# Patient Record
Sex: MALE | Race: WHITE
[De-identification: names, ages, dates, MRNs, and addresses within clinical notes are randomized per-mention and may not be internally consistent; named-entity substitution may affect disease eponyms.]

---

## 2020-12-13 ENCOUNTER — HOSPITAL ENCOUNTER (EMERGENCY)
Dept: HOSPITAL 8 - ED | Age: 60
Discharge: HOME | End: 2020-12-13
Payer: MEDICAID

## 2020-12-13 VITALS — WEIGHT: 158.73 LBS | BODY MASS INDEX: 24.06 KG/M2 | HEIGHT: 68 IN

## 2020-12-13 VITALS — DIASTOLIC BLOOD PRESSURE: 77 MMHG | SYSTOLIC BLOOD PRESSURE: 122 MMHG

## 2020-12-13 DIAGNOSIS — J00: Primary | ICD-10-CM

## 2020-12-13 DIAGNOSIS — Z20.828: ICD-10-CM

## 2020-12-13 PROCEDURE — 99283 EMERGENCY DEPT VISIT LOW MDM: CPT

## 2020-12-13 PROCEDURE — 87635 SARS-COV-2 COVID-19 AMP PRB: CPT
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
## 2021-02-11 ENCOUNTER — HOSPITAL ENCOUNTER (EMERGENCY)
Facility: MEDICAL CENTER | Age: 61
End: 2021-02-11
Attending: EMERGENCY MEDICINE | Admitting: EMERGENCY MEDICINE
Payer: MEDICAID

## 2021-02-11 VITALS
OXYGEN SATURATION: 96 % | TEMPERATURE: 97.4 F | BODY MASS INDEX: 18.51 KG/M2 | SYSTOLIC BLOOD PRESSURE: 134 MMHG | DIASTOLIC BLOOD PRESSURE: 72 MMHG | WEIGHT: 125 LBS | HEART RATE: 75 BPM | HEIGHT: 69 IN | RESPIRATION RATE: 16 BRPM

## 2021-02-11 DIAGNOSIS — K08.89 PAIN, DENTAL: ICD-10-CM

## 2021-02-11 DIAGNOSIS — Z76.0 MEDICATION REFILL: ICD-10-CM

## 2021-02-11 PROCEDURE — 700102 HCHG RX REV CODE 250 W/ 637 OVERRIDE(OP): Performed by: EMERGENCY MEDICINE

## 2021-02-11 PROCEDURE — 99283 EMERGENCY DEPT VISIT LOW MDM: CPT

## 2021-02-11 PROCEDURE — A9270 NON-COVERED ITEM OR SERVICE: HCPCS | Performed by: EMERGENCY MEDICINE

## 2021-02-11 RX ORDER — HYDROCODONE BITARTRATE AND ACETAMINOPHEN 5; 325 MG/1; MG/1
1 TABLET ORAL ONCE
Status: COMPLETED | OUTPATIENT
Start: 2021-02-11 | End: 2021-02-11

## 2021-02-11 RX ORDER — PENICILLIN V POTASSIUM 500 MG/1
500 TABLET ORAL ONCE
Status: COMPLETED | OUTPATIENT
Start: 2021-02-11 | End: 2021-02-11

## 2021-02-11 RX ORDER — ALBUTEROL SULFATE 90 UG/1
2 AEROSOL, METERED RESPIRATORY (INHALATION) EVERY 6 HOURS PRN
Qty: 8.5 G | Refills: 0 | Status: SHIPPED | OUTPATIENT
Start: 2021-02-11

## 2021-02-11 RX ORDER — ALBUTEROL SULFATE 0.63 MG/3ML
0.63 SOLUTION RESPIRATORY (INHALATION) EVERY 4 HOURS PRN
COMMUNITY
End: 2021-02-11

## 2021-02-11 RX ORDER — PENICILLIN V POTASSIUM 500 MG/1
500 TABLET ORAL 4 TIMES DAILY
Qty: 28 TABLET | Refills: 0 | Status: SHIPPED | OUTPATIENT
Start: 2021-02-11 | End: 2021-02-18

## 2021-02-11 RX ADMIN — HYDROCODONE BITARTRATE AND ACETAMINOPHEN 1 TABLET: 5; 325 TABLET ORAL at 17:33

## 2021-02-11 RX ADMIN — PENICILLIN V POTASSIUM 500 MG: 500 TABLET, FILM COATED ORAL at 17:34

## 2021-02-12 NOTE — ED PROVIDER NOTES
"ED Provider Note  CHIEF COMPLAINT  Chief Complaint   Patient presents with    Dental Pain     c/o tooth pain. bottom front teeth. \"Pt states he wants to take a hammer to his teeth\"    Medication Refill     refill for his inhaler. pt diagnosed for COPD       HPI  Dale Mckeon is a 60 y.o. male who presents to the ER complaining of lower toothache for the last several days.  He says he is actually had a tooth ache for about a month, but it seems to gotten worse over the last few days.  He has not been able to see a dentist.  He also would like a refill of his inhaler.  No facial swelling.  No fevers or chills.  No nausea or vomiting.  No swelling under the tongue or under the chin.  No difficulty breathing or swallowing.    REVIEW OF SYSTEMS  Positive for tooth ache. Negative for fevers, chills, difficulty swallowing, difficulty breathing, facial swelling, nausea, vomiting  PAST MEDICAL HISTORY   has a past medical history of Chronic obstructive pulmonary disease (HCC).    SOCIAL HISTORY  Social History     Tobacco Use    Smoking status: Current Every Day Smoker   Substance and Sexual Activity    Alcohol use: Yes    Drug use: Not Currently    Sexual activity: Not on file       SURGICAL HISTORY  patient denies any surgical history    CURRENT MEDICATIONS  Reviewed.  See Encounter Summary.      ALLERGIES  No Known Allergies    PHYSICAL EXAM  VITAL SIGNS: /78   Pulse 71   Temp 36.2 °C (97.2 °F) (Temporal)   Resp 16   Ht 1.753 m (5' 9\")   Wt 56.7 kg (125 lb)   SpO2 95%   BMI 18.46 kg/m²   Constitutional: Alert in moderate to severe apparent distress due to dental pain.  HENT: Normocephalic, atraumatic; Bilateral external ears normal. Nose normal.  No facial swelling.  No sublingual or submental swelling.  He has poor dentition throughout.  The left lower front tooth is decayed and tender to percussion.  There is some mild associated gingival swelling.  No facial swelling.  No trismus.  Eyes: Pupils are " equal and reactive. Conjunctiva normal, non-icteric.   Thorax & Lungs: Decreased breath sounds bilaterally.  No wheezes rales or rhonchi.  Heart regular rate and rhythm without murmurs rubs or gallops.  Skin: Warm and Dry, No erythema, No rash.   Extremities:   Full range of motion  Neurologic: Alert and oriented x 4, clear speech   Psychiatric: Affect and Mood normal      COURSE & MEDICAL DECISION MAKING  Nursing notes and vital signs were reviewed. (See chart for details)    I verified that the patient was wearing a mask and I was wearing appropriate PPE every time I entered the room. The patient's mask was on the patient at all times during my encounter except for a brief view of the oropharynx.     The patient presents to the Emergency Department with complaints of dental pain for the last few days.  He says it actually started about a month ago but it got worse over the last few days.  He is tender in the left front lower tooth.  There is some associated gingival swelling.  No sublingual or submental swelling.  No concern for Shyam's angina at this time.  Patient has not taken any Tylenol or ibuprofen today.  I gave him a [place] here in the ER as he was in quite a bit of discomfort due to his tooth ache.  Otherwise I told him he needs to be on antibiotic and he is to take over-the-counter Tylenol and ibuprofen until he sees the dentist or oral surgeon.  He understands importance of follow-up.  He was given his first dose of penicillin here in the ER.  The patient also requests a refill of an albuterol inhaler for his COPD.  He is not any respiratory distress and has no respiratory complaints at this time.  He simply just needs a refill.  O2 sats are 95%.  He says he has been trying to get in at the [facility] and the [facility] for the last couple weeks but has been unable to make an appointment.  At this time patient has a lower left front tooth which is very tender to percussion.   This is the tooth identified by the patient is because of his pain.  At this time no evidence for sepsis or facial cellulitis.  He has been given strict return precautions and discharge instructions and he understands treatment plan and follow-up.      Discharge Medications: Pen-Vee K 500 mg 1 p.o. 4 times daily x10 days, albuterol inhaler    FINAL IMPRESSION  1. Medication refill Acute    2. Pain, dental Acute        This dictation has been created using voice recognition software. The accuracy of the dictation is limited by the abilities of the software. I expect there may be some errors of grammar and possibly content. I made every attempt to manually correct the errors within my dictation. However, errors related to voice recognition software may still exist and should be interpreted within the appropriate context.

## 2021-02-12 NOTE — DISCHARGE INSTRUCTIONS
Return to ER for worsening pain, facial swelling, changing pain, fever over 100.4, chills, swelling under your chin or under your tongue, nausea, vomiting, or for any concerns.    Perform good dental hygiene by brushing your teeth 2-3 times a day and flossing every day.     Use over the counter motrin and tylenol for pain.    Follow up with Dr. Becker, oral surgeon, in 1-2 days. Call for appointment.

## 2021-02-12 NOTE — ED TRIAGE NOTES
"..  Chief Complaint   Patient presents with   • Dental Pain     c/o tooth pain. bottom front teeth. \"Pt states he wants to take a hammer to his teeth\"   • Medication Refill     refill for his inhaler. pt diagnosed for COPD         /78   Pulse 71   Temp 36.2 °C (97.2 °F) (Temporal)   Resp 16   Ht 1.753 m (5' 9\")   Wt 56.7 kg (125 lb)   SpO2 95%          Explained triage process, to waiting room. Asked to inform RN if questions or concerns arise.   "

## 2022-01-09 ENCOUNTER — APPOINTMENT (OUTPATIENT)
Dept: RADIOLOGY | Facility: MEDICAL CENTER | Age: 62
DRG: 193 | End: 2022-01-09
Attending: EMERGENCY MEDICINE
Payer: MEDICAID

## 2022-01-09 ENCOUNTER — APPOINTMENT (OUTPATIENT)
Dept: RADIOLOGY | Facility: MEDICAL CENTER | Age: 62
DRG: 193 | End: 2022-01-09
Payer: MEDICAID

## 2022-01-09 ENCOUNTER — HOSPITAL ENCOUNTER (INPATIENT)
Facility: MEDICAL CENTER | Age: 62
LOS: 1 days | DRG: 193 | End: 2022-01-10
Attending: EMERGENCY MEDICINE | Admitting: STUDENT IN AN ORGANIZED HEALTH CARE EDUCATION/TRAINING PROGRAM
Payer: MEDICAID

## 2022-01-09 ENCOUNTER — HOSPITAL ENCOUNTER (EMERGENCY)
Facility: MEDICAL CENTER | Age: 62
DRG: 193 | End: 2022-01-09
Payer: MEDICAID

## 2022-01-09 VITALS
HEIGHT: 69 IN | RESPIRATION RATE: 16 BRPM | DIASTOLIC BLOOD PRESSURE: 56 MMHG | WEIGHT: 151.24 LBS | BODY MASS INDEX: 22.4 KG/M2 | OXYGEN SATURATION: 91 % | TEMPERATURE: 97.6 F | SYSTOLIC BLOOD PRESSURE: 95 MMHG | HEART RATE: 60 BPM

## 2022-01-09 DIAGNOSIS — J18.9 PNEUMONIA OF RIGHT LUNG DUE TO INFECTIOUS ORGANISM, UNSPECIFIED PART OF LUNG: ICD-10-CM

## 2022-01-09 DIAGNOSIS — J90 PLEURAL EFFUSION: ICD-10-CM

## 2022-01-09 PROBLEM — Z72.0 TOBACCO USE: Status: ACTIVE | Noted: 2022-01-09

## 2022-01-09 PROBLEM — J44.0 CHRONIC OBSTRUCTIVE PULMONARY DISEASE WITH ACUTE LOWER RESPIRATORY INFECTION (HCC): Status: ACTIVE | Noted: 2022-01-09

## 2022-01-09 PROBLEM — R07.81 PLEURITIC CHEST PAIN: Status: ACTIVE | Noted: 2022-01-09

## 2022-01-09 LAB
ALBUMIN SERPL BCP-MCNC: 3.5 G/DL (ref 3.2–4.9)
ALBUMIN SERPL BCP-MCNC: 3.8 G/DL (ref 3.2–4.9)
ALBUMIN/GLOB SERPL: 1.1 G/DL
ALBUMIN/GLOB SERPL: 1.2 G/DL
ALP SERPL-CCNC: 90 U/L (ref 30–99)
ALP SERPL-CCNC: 96 U/L (ref 30–99)
ALT SERPL-CCNC: 20 U/L (ref 2–50)
ALT SERPL-CCNC: 21 U/L (ref 2–50)
ANION GAP SERPL CALC-SCNC: 13 MMOL/L (ref 7–16)
ANION GAP SERPL CALC-SCNC: 9 MMOL/L (ref 7–16)
AST SERPL-CCNC: 17 U/L (ref 12–45)
AST SERPL-CCNC: <5 U/L (ref 12–45)
BASOPHILS # BLD AUTO: 1.7 % (ref 0–1.8)
BASOPHILS # BLD AUTO: 2.6 % (ref 0–1.8)
BASOPHILS # BLD: 0.31 K/UL (ref 0–0.12)
BASOPHILS # BLD: 0.46 K/UL (ref 0–0.12)
BILIRUB SERPL-MCNC: 0.3 MG/DL (ref 0.1–1.5)
BILIRUB SERPL-MCNC: 0.4 MG/DL (ref 0.1–1.5)
BUN SERPL-MCNC: 13 MG/DL (ref 8–22)
BUN SERPL-MCNC: 14 MG/DL (ref 8–22)
CALCIUM SERPL-MCNC: 10.2 MG/DL (ref 8.5–10.5)
CALCIUM SERPL-MCNC: 9.5 MG/DL (ref 8.5–10.5)
CHLORIDE SERPL-SCNC: 101 MMOL/L (ref 96–112)
CHLORIDE SERPL-SCNC: 101 MMOL/L (ref 96–112)
CO2 SERPL-SCNC: 24 MMOL/L (ref 20–33)
CO2 SERPL-SCNC: 27 MMOL/L (ref 20–33)
CREAT SERPL-MCNC: 0.84 MG/DL (ref 0.5–1.4)
CREAT SERPL-MCNC: 0.87 MG/DL (ref 0.5–1.4)
EKG IMPRESSION: NORMAL
EOSINOPHIL # BLD AUTO: 0.96 K/UL (ref 0–0.51)
EOSINOPHIL # BLD AUTO: 1.36 K/UL (ref 0–0.51)
EOSINOPHIL NFR BLD: 5.2 % (ref 0–6.9)
EOSINOPHIL NFR BLD: 7.7 % (ref 0–6.9)
ERYTHROCYTE [DISTWIDTH] IN BLOOD BY AUTOMATED COUNT: 46.5 FL (ref 35.9–50)
ERYTHROCYTE [DISTWIDTH] IN BLOOD BY AUTOMATED COUNT: 46.5 FL (ref 35.9–50)
FLUAV RNA SPEC QL NAA+PROBE: NEGATIVE
FLUBV RNA SPEC QL NAA+PROBE: NEGATIVE
GLOBULIN SER CALC-MCNC: 3.2 G/DL (ref 1.9–3.5)
GLOBULIN SER CALC-MCNC: 3.3 G/DL (ref 1.9–3.5)
GLUCOSE SERPL-MCNC: 94 MG/DL (ref 65–99)
GLUCOSE SERPL-MCNC: 95 MG/DL (ref 65–99)
HCT VFR BLD AUTO: 46.4 % (ref 42–52)
HCT VFR BLD AUTO: 47.5 % (ref 42–52)
HGB BLD-MCNC: 15.1 G/DL (ref 14–18)
HGB BLD-MCNC: 15.7 G/DL (ref 14–18)
LYMPHOCYTES # BLD AUTO: 2.73 K/UL (ref 1–4.8)
LYMPHOCYTES # BLD AUTO: 2.74 K/UL (ref 1–4.8)
LYMPHOCYTES NFR BLD: 14.8 % (ref 22–41)
LYMPHOCYTES NFR BLD: 15.5 % (ref 22–41)
MANUAL DIFF BLD: NORMAL
MANUAL DIFF BLD: NORMAL
MCH RBC QN AUTO: 30.3 PG (ref 27–33)
MCH RBC QN AUTO: 30.7 PG (ref 27–33)
MCHC RBC AUTO-ENTMCNC: 32.5 G/DL (ref 33.7–35.3)
MCHC RBC AUTO-ENTMCNC: 33.1 G/DL (ref 33.7–35.3)
MCV RBC AUTO: 92.8 FL (ref 81.4–97.8)
MCV RBC AUTO: 93.2 FL (ref 81.4–97.8)
METAMYELOCYTES NFR BLD MANUAL: 0.9 %
MONOCYTES # BLD AUTO: 1.37 K/UL (ref 0–0.85)
MONOCYTES # BLD AUTO: 1.44 K/UL (ref 0–0.85)
MONOCYTES NFR BLD AUTO: 7.8 % (ref 0–13.4)
MONOCYTES NFR BLD AUTO: 7.8 % (ref 0–13.4)
MORPHOLOGY BLD-IMP: NORMAL
MORPHOLOGY BLD-IMP: NORMAL
MYELOCYTES NFR BLD MANUAL: 0.9 %
MYELOCYTES NFR BLD MANUAL: 1.7 %
NEUTROPHILS # BLD AUTO: 11.53 K/UL (ref 1.82–7.42)
NEUTROPHILS # BLD AUTO: 12.56 K/UL (ref 1.82–7.42)
NEUTROPHILS NFR BLD: 65.5 % (ref 44–72)
NEUTROPHILS NFR BLD: 67 % (ref 44–72)
NEUTS BAND NFR BLD MANUAL: 0.9 % (ref 0–10)
NRBC # BLD AUTO: 0 K/UL
NRBC # BLD AUTO: 0 K/UL
NRBC BLD-RTO: 0 /100 WBC
NRBC BLD-RTO: 0 /100 WBC
PLATELET # BLD AUTO: 706 K/UL (ref 164–446)
PLATELET # BLD AUTO: 741 K/UL (ref 164–446)
PLATELET BLD QL SMEAR: NORMAL
PLATELET BLD QL SMEAR: NORMAL
PMV BLD AUTO: 8.4 FL (ref 9–12.9)
PMV BLD AUTO: 8.5 FL (ref 9–12.9)
POTASSIUM SERPL-SCNC: 4.6 MMOL/L (ref 3.6–5.5)
POTASSIUM SERPL-SCNC: 4.7 MMOL/L (ref 3.6–5.5)
PROT SERPL-MCNC: 6.7 G/DL (ref 6–8.2)
PROT SERPL-MCNC: 7.1 G/DL (ref 6–8.2)
RBC # BLD AUTO: 4.98 M/UL (ref 4.7–6.1)
RBC # BLD AUTO: 5.12 M/UL (ref 4.7–6.1)
RBC BLD AUTO: NORMAL
RSV RNA SPEC QL NAA+PROBE: NEGATIVE
SARS-COV-2 RNA RESP QL NAA+PROBE: NOTDETECTED
SODIUM SERPL-SCNC: 137 MMOL/L (ref 135–145)
SODIUM SERPL-SCNC: 138 MMOL/L (ref 135–145)
SPECIMEN SOURCE: NORMAL
TROPONIN T SERPL-MCNC: 13 NG/L (ref 6–19)
WBC # BLD AUTO: 17.6 K/UL (ref 4.8–10.8)
WBC # BLD AUTO: 18.5 K/UL (ref 4.8–10.8)

## 2022-01-09 PROCEDURE — A9270 NON-COVERED ITEM OR SERVICE: HCPCS | Performed by: STUDENT IN AN ORGANIZED HEALTH CARE EDUCATION/TRAINING PROGRAM

## 2022-01-09 PROCEDURE — 99223 1ST HOSP IP/OBS HIGH 75: CPT | Mod: 25 | Performed by: STUDENT IN AN ORGANIZED HEALTH CARE EDUCATION/TRAINING PROGRAM

## 2022-01-09 PROCEDURE — 700111 HCHG RX REV CODE 636 W/ 250 OVERRIDE (IP): Performed by: EMERGENCY MEDICINE

## 2022-01-09 PROCEDURE — 85027 COMPLETE CBC AUTOMATED: CPT

## 2022-01-09 PROCEDURE — 700105 HCHG RX REV CODE 258: Performed by: STUDENT IN AN ORGANIZED HEALTH CARE EDUCATION/TRAINING PROGRAM

## 2022-01-09 PROCEDURE — 85007 BL SMEAR W/DIFF WBC COUNT: CPT

## 2022-01-09 PROCEDURE — 99285 EMERGENCY DEPT VISIT HI MDM: CPT

## 2022-01-09 PROCEDURE — 93005 ELECTROCARDIOGRAM TRACING: CPT | Performed by: EMERGENCY MEDICINE

## 2022-01-09 PROCEDURE — 85007 BL SMEAR W/DIFF WBC COUNT: CPT | Mod: 91

## 2022-01-09 PROCEDURE — 80053 COMPREHEN METABOLIC PANEL: CPT | Mod: 91

## 2022-01-09 PROCEDURE — 99406 BEHAV CHNG SMOKING 3-10 MIN: CPT

## 2022-01-09 PROCEDURE — 93005 ELECTROCARDIOGRAM TRACING: CPT

## 2022-01-09 PROCEDURE — 700117 HCHG RX CONTRAST REV CODE 255: Performed by: EMERGENCY MEDICINE

## 2022-01-09 PROCEDURE — 94640 AIRWAY INHALATION TREATMENT: CPT

## 2022-01-09 PROCEDURE — 96375 TX/PRO/DX INJ NEW DRUG ADDON: CPT

## 2022-01-09 PROCEDURE — 770001 HCHG ROOM/CARE - MED/SURG/GYN PRIV*

## 2022-01-09 PROCEDURE — 80053 COMPREHEN METABOLIC PANEL: CPT

## 2022-01-09 PROCEDURE — 0241U HCHG SARS-COV-2 COVID-19 NFCT DS RESP RNA 4 TRGT MIC: CPT

## 2022-01-09 PROCEDURE — 85027 COMPLETE CBC AUTOMATED: CPT | Mod: 91

## 2022-01-09 PROCEDURE — 84157 ASSAY OF PROTEIN OTHER: CPT

## 2022-01-09 PROCEDURE — 700111 HCHG RX REV CODE 636 W/ 250 OVERRIDE (IP): Performed by: STUDENT IN AN ORGANIZED HEALTH CARE EDUCATION/TRAINING PROGRAM

## 2022-01-09 PROCEDURE — 99406 BEHAV CHNG SMOKING 3-10 MIN: CPT | Performed by: STUDENT IN AN ORGANIZED HEALTH CARE EDUCATION/TRAINING PROGRAM

## 2022-01-09 PROCEDURE — A9270 NON-COVERED ITEM OR SERVICE: HCPCS | Performed by: EMERGENCY MEDICINE

## 2022-01-09 PROCEDURE — 84484 ASSAY OF TROPONIN QUANT: CPT

## 2022-01-09 PROCEDURE — 700102 HCHG RX REV CODE 250 W/ 637 OVERRIDE(OP): Performed by: EMERGENCY MEDICINE

## 2022-01-09 PROCEDURE — 700102 HCHG RX REV CODE 250 W/ 637 OVERRIDE(OP): Performed by: STUDENT IN AN ORGANIZED HEALTH CARE EDUCATION/TRAINING PROGRAM

## 2022-01-09 PROCEDURE — 302449 STATCHG TRIAGE ONLY (STATISTIC)

## 2022-01-09 PROCEDURE — 71275 CT ANGIOGRAPHY CHEST: CPT

## 2022-01-09 PROCEDURE — 96365 THER/PROPH/DIAG IV INF INIT: CPT

## 2022-01-09 RX ORDER — ALBUTEROL SULFATE 90 UG/1
2 AEROSOL, METERED RESPIRATORY (INHALATION) EVERY 6 HOURS PRN
Status: DISCONTINUED | OUTPATIENT
Start: 2022-01-09 | End: 2022-01-10 | Stop reason: HOSPADM

## 2022-01-09 RX ORDER — HYDROXYZINE HYDROCHLORIDE 25 MG/1
25 TABLET, FILM COATED ORAL 3 TIMES DAILY PRN
Status: DISCONTINUED | OUTPATIENT
Start: 2022-01-09 | End: 2022-01-10 | Stop reason: HOSPADM

## 2022-01-09 RX ORDER — AMOXICILLIN 250 MG
2 CAPSULE ORAL 2 TIMES DAILY
Status: DISCONTINUED | OUTPATIENT
Start: 2022-01-09 | End: 2022-01-10 | Stop reason: HOSPADM

## 2022-01-09 RX ORDER — TIOTROPIUM BROMIDE AND OLODATEROL 3.124; 2.736 UG/1; UG/1
2 SPRAY, METERED RESPIRATORY (INHALATION) DAILY
COMMUNITY

## 2022-01-09 RX ORDER — ALBUTEROL SULFATE 90 UG/1
2 AEROSOL, METERED RESPIRATORY (INHALATION) ONCE
Status: COMPLETED | OUTPATIENT
Start: 2022-01-09 | End: 2022-01-09

## 2022-01-09 RX ORDER — CEFTRIAXONE 2 G/1
2 INJECTION, POWDER, FOR SOLUTION INTRAMUSCULAR; INTRAVENOUS ONCE
Status: COMPLETED | OUTPATIENT
Start: 2022-01-09 | End: 2022-01-09

## 2022-01-09 RX ORDER — IBUPROFEN 200 MG
1000 TABLET ORAL
COMMUNITY

## 2022-01-09 RX ORDER — BISACODYL 10 MG
10 SUPPOSITORY, RECTAL RECTAL
Status: DISCONTINUED | OUTPATIENT
Start: 2022-01-09 | End: 2022-01-10 | Stop reason: HOSPADM

## 2022-01-09 RX ORDER — NAPROXEN SODIUM 220 MG
220 TABLET ORAL EVERY 8 HOURS PRN
COMMUNITY

## 2022-01-09 RX ORDER — AZITHROMYCIN 500 MG/5ML
500 INJECTION, POWDER, LYOPHILIZED, FOR SOLUTION INTRAVENOUS EVERY 24 HOURS
Status: DISCONTINUED | OUTPATIENT
Start: 2022-01-10 | End: 2022-01-10

## 2022-01-09 RX ORDER — POLYETHYLENE GLYCOL 3350 17 G/17G
1 POWDER, FOR SOLUTION ORAL
Status: DISCONTINUED | OUTPATIENT
Start: 2022-01-09 | End: 2022-01-10 | Stop reason: HOSPADM

## 2022-01-09 RX ORDER — AZITHROMYCIN 250 MG/1
500 TABLET, FILM COATED ORAL DAILY
Status: DISCONTINUED | OUTPATIENT
Start: 2022-01-10 | End: 2022-01-10

## 2022-01-09 RX ORDER — IBUPROFEN 600 MG/1
600 TABLET ORAL EVERY 6 HOURS PRN
Status: DISCONTINUED | OUTPATIENT
Start: 2022-01-09 | End: 2022-01-10 | Stop reason: HOSPADM

## 2022-01-09 RX ORDER — ACETAMINOPHEN 325 MG/1
650 TABLET ORAL EVERY 6 HOURS PRN
Status: DISCONTINUED | OUTPATIENT
Start: 2022-01-09 | End: 2022-01-10 | Stop reason: HOSPADM

## 2022-01-09 RX ORDER — LABETALOL HYDROCHLORIDE 5 MG/ML
10 INJECTION, SOLUTION INTRAVENOUS EVERY 4 HOURS PRN
Status: DISCONTINUED | OUTPATIENT
Start: 2022-01-09 | End: 2022-01-10 | Stop reason: HOSPADM

## 2022-01-09 RX ADMIN — IBUPROFEN 600 MG: 600 TABLET ORAL at 23:08

## 2022-01-09 RX ADMIN — IOHEXOL 50 ML: 350 INJECTION, SOLUTION INTRAVENOUS at 19:30

## 2022-01-09 RX ADMIN — CEFTRIAXONE SODIUM 2 G: 2 INJECTION, POWDER, FOR SOLUTION INTRAMUSCULAR; INTRAVENOUS at 20:14

## 2022-01-09 RX ADMIN — ALBUTEROL SULFATE 2 PUFF: 90 AEROSOL, METERED RESPIRATORY (INHALATION) at 18:33

## 2022-01-09 RX ADMIN — SENNOSIDES AND DOCUSATE SODIUM 2 TABLET: 50; 8.6 TABLET ORAL at 20:34

## 2022-01-09 RX ADMIN — SODIUM CHLORIDE 3 G: 900 INJECTION INTRAVENOUS at 20:35

## 2022-01-09 RX ADMIN — NICOTINE POLACRILEX 2 MG: 2 GUM, CHEWING BUCCAL at 23:16

## 2022-01-09 RX ADMIN — HYDROXYZINE HYDROCHLORIDE 25 MG: 25 TABLET, FILM COATED ORAL at 23:46

## 2022-01-09 ASSESSMENT — ENCOUNTER SYMPTOMS
SPUTUM PRODUCTION: 1
SHORTNESS OF BREATH: 1
WEAKNESS: 1
NAUSEA: 0
CHILLS: 0
HEADACHES: 0
ABDOMINAL PAIN: 0
VOMITING: 0
COUGH: 1
SORE THROAT: 0
DIARRHEA: 0
DIZZINESS: 0
TINGLING: 1
FEVER: 0

## 2022-01-09 ASSESSMENT — LIFESTYLE VARIABLES
TOTAL SCORE: 0
EVER_SMOKED: NEVER
EVER FELT BAD OR GUILTY ABOUT YOUR DRINKING: NO
DO YOU DRINK ALCOHOL: NO
CONSUMPTION TOTAL: INCOMPLETE
EVER HAD A DRINK FIRST THING IN THE MORNING TO STEADY YOUR NERVES TO GET RID OF A HANGOVER: NO
HAVE YOU EVER FELT YOU SHOULD CUT DOWN ON YOUR DRINKING: NO
HAVE PEOPLE ANNOYED YOU BY CRITICIZING YOUR DRINKING: NO
TOTAL SCORE: 0
DOES PATIENT WANT TO STOP DRINKING: NO
TOTAL SCORE: 0

## 2022-01-09 ASSESSMENT — FIBROSIS 4 INDEX: FIB4 SCORE: 0.32

## 2022-01-09 NOTE — ED TRIAGE NOTES
"Dale Mckeon  61 y.o. male  Chief Complaint   Patient presents with   • Shortness of Breath     Right sided lung pain onset 2 days ago \"any time I move\". Chronic right shoulder intermittent nerve pains preceeding lung pain onset.        Pt ambulatory to triage with stooped gait for above complaint.     Pt is alert and oriented, speaking in full sentences, follows commands and responds appropriately to questions. Resp are audibly wheezing.    Protocol ordered. Pt placed in hallway for phelbotomy. Pt educated on triage process. Pt encouraged to alert staff for any changes. This RN masked and in appropriate PPE during encounter.     Vitals:    01/09/22 1123   BP: 106/68   Pulse: 89   Resp: 20   Temp: 36.4 °C (97.6 °F)   SpO2: 93%     "

## 2022-01-10 ENCOUNTER — APPOINTMENT (OUTPATIENT)
Dept: RADIOLOGY | Facility: MEDICAL CENTER | Age: 62
DRG: 193 | End: 2022-01-10
Attending: STUDENT IN AN ORGANIZED HEALTH CARE EDUCATION/TRAINING PROGRAM
Payer: MEDICAID

## 2022-01-10 ENCOUNTER — APPOINTMENT (OUTPATIENT)
Dept: CARDIOLOGY | Facility: MEDICAL CENTER | Age: 62
DRG: 193 | End: 2022-01-10
Attending: INTERNAL MEDICINE
Payer: MEDICAID

## 2022-01-10 VITALS
SYSTOLIC BLOOD PRESSURE: 111 MMHG | OXYGEN SATURATION: 91 % | WEIGHT: 151.24 LBS | RESPIRATION RATE: 16 BRPM | BODY MASS INDEX: 22.92 KG/M2 | HEART RATE: 69 BPM | TEMPERATURE: 98.4 F | HEIGHT: 68 IN | DIASTOLIC BLOOD PRESSURE: 70 MMHG

## 2022-01-10 PROBLEM — R59.0 HILAR ADENOPATHY: Status: ACTIVE | Noted: 2022-01-10

## 2022-01-10 PROBLEM — J96.01 ACUTE RESPIRATORY FAILURE WITH HYPOXIA (HCC): Status: ACTIVE | Noted: 2022-01-10

## 2022-01-10 LAB
ALBUMIN FLD-MCNC: 2.3 G/DL
ANION GAP SERPL CALC-SCNC: 8 MMOL/L (ref 7–16)
APPEARANCE FLD: NORMAL
BASOPHILS # BLD AUTO: 1.7 % (ref 0–1.8)
BASOPHILS # BLD: 0.25 K/UL (ref 0–0.12)
BODY FLD TYPE: NORMAL
BUN SERPL-MCNC: 14 MG/DL (ref 8–22)
CALCIUM SERPL-MCNC: 9 MG/DL (ref 8.5–10.5)
CHLORIDE SERPL-SCNC: 106 MMOL/L (ref 96–112)
CO2 SERPL-SCNC: 24 MMOL/L (ref 20–33)
COLOR FLD: YELLOW
CREAT SERPL-MCNC: 0.87 MG/DL (ref 0.5–1.4)
CSF COMMENTS 1658: NORMAL
EOSINOPHIL # BLD AUTO: 1.66 K/UL (ref 0–0.51)
EOSINOPHIL NFR BLD: 11.2 % (ref 0–6.9)
EOSINOPHIL NFR FLD: 5 %
ERYTHROCYTE [DISTWIDTH] IN BLOOD BY AUTOMATED COUNT: 46.1 FL (ref 35.9–50)
GLUCOSE FLD-MCNC: 92 MG/DL
GLUCOSE SERPL-MCNC: 119 MG/DL (ref 65–99)
GRAM STN SPEC: NORMAL
HCT VFR BLD AUTO: 41.2 % (ref 42–52)
HGB BLD-MCNC: 13.6 G/DL (ref 14–18)
INR PPP: 1.05 (ref 0.87–1.13)
LDH FLD L TO P-CCNC: 572 U/L
LDH SERPL L TO P-CCNC: 434 U/L (ref 107–266)
LV EJECT FRACT  99904: 65
LV EJECT FRACT MOD 2C 99903: 87.24
LV EJECT FRACT MOD 4C 99902: 89.52
LV EJECT FRACT MOD BP 99901: 88.39
LYMPHOCYTES # BLD AUTO: 2.04 K/UL (ref 1–4.8)
LYMPHOCYTES NFR BLD: 13.8 % (ref 22–41)
LYMPHOCYTES NFR FLD: 16 %
MANUAL DIFF BLD: NORMAL
MCH RBC QN AUTO: 30.4 PG (ref 27–33)
MCHC RBC AUTO-ENTMCNC: 33 G/DL (ref 33.7–35.3)
MCV RBC AUTO: 92 FL (ref 81.4–97.8)
MESOTHL CELL NFR FLD: 1 %
METAMYELOCYTES NFR BLD MANUAL: 0.9 %
MONOCYTES # BLD AUTO: 1.66 K/UL (ref 0–0.85)
MONOCYTES NFR BLD AUTO: 11.2 % (ref 0–13.4)
MONONUC CELLS NFR FLD: 11 %
MORPHOLOGY BLD-IMP: NORMAL
MYELOCYTES NFR BLD MANUAL: 0.9 %
NEUTROPHILS # BLD AUTO: 8.92 K/UL (ref 1.82–7.42)
NEUTROPHILS NFR BLD: 60.3 % (ref 44–72)
NEUTROPHILS NFR FLD: 67 %
NRBC # BLD AUTO: 0 K/UL
NRBC BLD-RTO: 0 /100 WBC
PH FLD: 8 [PH]
PLATELET # BLD AUTO: 621 K/UL (ref 164–446)
PLATELET BLD QL SMEAR: NORMAL
PMV BLD AUTO: 8.4 FL (ref 9–12.9)
POTASSIUM SERPL-SCNC: 4.4 MMOL/L (ref 3.6–5.5)
PROT FLD-MCNC: 3.7 G/DL
PROTHROMBIN TIME: 13.4 SEC (ref 12–14.6)
RBC # BLD AUTO: 4.48 M/UL (ref 4.7–6.1)
RBC # FLD: 5000 CELLS/UL
RBC BLD AUTO: NORMAL
SIGNIFICANT IND 70042: NORMAL
SITE SITE: NORMAL
SODIUM SERPL-SCNC: 138 MMOL/L (ref 135–145)
SOURCE SOURCE: NORMAL
WBC # BLD AUTO: 14.8 K/UL (ref 4.8–10.8)
WBC # FLD: NORMAL CELLS/UL

## 2022-01-10 PROCEDURE — 93306 TTE W/DOPPLER COMPLETE: CPT | Mod: 26 | Performed by: INTERNAL MEDICINE

## 2022-01-10 PROCEDURE — 87015 SPECIMEN INFECT AGNT CONCNTJ: CPT

## 2022-01-10 PROCEDURE — 700117 HCHG RX CONTRAST REV CODE 255: Performed by: INTERNAL MEDICINE

## 2022-01-10 PROCEDURE — 32555 ASPIRATE PLEURA W/ IMAGING: CPT | Mod: RT | Performed by: NURSE PRACTITIONER

## 2022-01-10 PROCEDURE — 700111 HCHG RX REV CODE 636 W/ 250 OVERRIDE (IP): Performed by: INTERNAL MEDICINE

## 2022-01-10 PROCEDURE — 700102 HCHG RX REV CODE 250 W/ 637 OVERRIDE(OP): Performed by: STUDENT IN AN ORGANIZED HEALTH CARE EDUCATION/TRAINING PROGRAM

## 2022-01-10 PROCEDURE — 85027 COMPLETE CBC AUTOMATED: CPT

## 2022-01-10 PROCEDURE — 83615 LACTATE (LD) (LDH) ENZYME: CPT | Mod: 91

## 2022-01-10 PROCEDURE — 700101 HCHG RX REV CODE 250: Performed by: EMERGENCY MEDICINE

## 2022-01-10 PROCEDURE — 700111 HCHG RX REV CODE 636 W/ 250 OVERRIDE (IP): Performed by: STUDENT IN AN ORGANIZED HEALTH CARE EDUCATION/TRAINING PROGRAM

## 2022-01-10 PROCEDURE — 87205 SMEAR GRAM STAIN: CPT

## 2022-01-10 PROCEDURE — 700102 HCHG RX REV CODE 250 W/ 637 OVERRIDE(OP): Performed by: INTERNAL MEDICINE

## 2022-01-10 PROCEDURE — 80048 BASIC METABOLIC PNL TOTAL CA: CPT

## 2022-01-10 PROCEDURE — 99221 1ST HOSP IP/OBS SF/LOW 40: CPT | Mod: GC | Performed by: INTERNAL MEDICINE

## 2022-01-10 PROCEDURE — 83986 ASSAY PH BODY FLUID NOS: CPT

## 2022-01-10 PROCEDURE — 89051 BODY FLUID CELL COUNT: CPT

## 2022-01-10 PROCEDURE — 87070 CULTURE OTHR SPECIMN AEROBIC: CPT

## 2022-01-10 PROCEDURE — 99239 HOSP IP/OBS DSCHRG MGMT >30: CPT | Mod: GC | Performed by: INTERNAL MEDICINE

## 2022-01-10 PROCEDURE — 85610 PROTHROMBIN TIME: CPT

## 2022-01-10 PROCEDURE — 93306 TTE W/DOPPLER COMPLETE: CPT

## 2022-01-10 PROCEDURE — 96367 TX/PROPH/DG ADDL SEQ IV INF: CPT

## 2022-01-10 PROCEDURE — 700111 HCHG RX REV CODE 636 W/ 250 OVERRIDE (IP): Performed by: EMERGENCY MEDICINE

## 2022-01-10 PROCEDURE — 82945 GLUCOSE OTHER FLUID: CPT

## 2022-01-10 PROCEDURE — 85007 BL SMEAR W/DIFF WBC COUNT: CPT

## 2022-01-10 PROCEDURE — A9270 NON-COVERED ITEM OR SERVICE: HCPCS | Performed by: INTERNAL MEDICINE

## 2022-01-10 PROCEDURE — A9270 NON-COVERED ITEM OR SERVICE: HCPCS | Performed by: STUDENT IN AN ORGANIZED HEALTH CARE EDUCATION/TRAINING PROGRAM

## 2022-01-10 PROCEDURE — 700105 HCHG RX REV CODE 258: Performed by: STUDENT IN AN ORGANIZED HEALTH CARE EDUCATION/TRAINING PROGRAM

## 2022-01-10 PROCEDURE — 82042 OTHER SOURCE ALBUMIN QUAN EA: CPT

## 2022-01-10 PROCEDURE — 4410569 US-THORACENTESIS PUNCTURE

## 2022-01-10 PROCEDURE — 700105 HCHG RX REV CODE 258: Performed by: EMERGENCY MEDICINE

## 2022-01-10 RX ORDER — GAUZE BANDAGE 2" X 2"
100 BANDAGE TOPICAL DAILY
Status: DISCONTINUED | OUTPATIENT
Start: 2022-01-10 | End: 2022-01-10 | Stop reason: HOSPADM

## 2022-01-10 RX ORDER — OMEPRAZOLE 20 MG/1
20 CAPSULE, DELAYED RELEASE ORAL DAILY
Status: DISCONTINUED | OUTPATIENT
Start: 2022-01-10 | End: 2022-01-10 | Stop reason: HOSPADM

## 2022-01-10 RX ORDER — METRONIDAZOLE 500 MG/1
500 TABLET ORAL EVERY 8 HOURS
Status: DISCONTINUED | OUTPATIENT
Start: 2022-01-10 | End: 2022-01-10 | Stop reason: HOSPADM

## 2022-01-10 RX ORDER — PREDNISONE 20 MG/1
40 TABLET ORAL DAILY
Status: DISCONTINUED | OUTPATIENT
Start: 2022-01-10 | End: 2022-01-10 | Stop reason: HOSPADM

## 2022-01-10 RX ORDER — IPRATROPIUM BROMIDE AND ALBUTEROL SULFATE 2.5; .5 MG/3ML; MG/3ML
3 SOLUTION RESPIRATORY (INHALATION)
Status: DISCONTINUED | OUTPATIENT
Start: 2022-01-10 | End: 2022-01-10 | Stop reason: HOSPADM

## 2022-01-10 RX ORDER — AZITHROMYCIN 250 MG/1
500 TABLET, FILM COATED ORAL DAILY
Status: DISCONTINUED | OUTPATIENT
Start: 2022-01-11 | End: 2022-01-10 | Stop reason: HOSPADM

## 2022-01-10 RX ADMIN — Medication 100 MG: at 11:47

## 2022-01-10 RX ADMIN — SODIUM CHLORIDE 3 G: 900 INJECTION INTRAVENOUS at 10:00

## 2022-01-10 RX ADMIN — SODIUM CHLORIDE 3 G: 900 INJECTION INTRAVENOUS at 04:40

## 2022-01-10 RX ADMIN — IBUPROFEN 600 MG: 600 TABLET ORAL at 11:47

## 2022-01-10 RX ADMIN — PREDNISONE 40 MG: 20 TABLET ORAL at 11:47

## 2022-01-10 RX ADMIN — AZITHROMYCIN MONOHYDRATE 500 MG: 500 INJECTION, POWDER, LYOPHILIZED, FOR SOLUTION INTRAVENOUS at 06:26

## 2022-01-10 RX ADMIN — HUMAN ALBUMIN MICROSPHERES AND PERFLUTREN 3 ML: 10; .22 INJECTION, SOLUTION INTRAVENOUS at 16:06

## 2022-01-10 ASSESSMENT — ENCOUNTER SYMPTOMS
DIZZINESS: 0
SHORTNESS OF BREATH: 1
HEADACHES: 0
VOMITING: 0
PALPITATIONS: 0
NAUSEA: 0
STRIDOR: 0
WHEEZING: 1
DEPRESSION: 1
SORE THROAT: 0
CLAUDICATION: 0
NECK PAIN: 1
HEARTBURN: 0
WEIGHT LOSS: 0
DIARRHEA: 0
CHILLS: 0
DOUBLE VISION: 0
COUGH: 1
SPUTUM PRODUCTION: 1
CONSTIPATION: 0
ORTHOPNEA: 0
PND: 0
FOCAL WEAKNESS: 0
DIAPHORESIS: 1
WHEEZING: 0
FEVER: 0
BLURRED VISION: 0
DIAPHORESIS: 0
HEMOPTYSIS: 0
WEAKNESS: 1
MYALGIAS: 0
ABDOMINAL PAIN: 0
SPUTUM PRODUCTION: 0
DEPRESSION: 0

## 2022-01-10 ASSESSMENT — PAIN DESCRIPTION - PAIN TYPE: TYPE: ACUTE PAIN

## 2022-01-10 NOTE — ASSESSMENT & PLAN NOTE
Significant smoking history, has cut down to less than half a pack a day.  Counseled on cessation for more than 3 minutes  Does not want nicotine patch, prefers to gum

## 2022-01-10 NOTE — DISCHARGE SUMMARY
"Discharge Summary- Left AMA    Date of Admission: 1/9/2022  Date of AMA:  01/10/2022  Attending: Nnamdi Howard M.D.   Senior Resident: Dr. Richard Siddiqi MD      CHIEF COMPLAINT ON ADMISSION  Chief Complaint   Patient presents with   • Difficulty Breathing     Pt complains of difficulty breathing, \"pain all up and down my lungs\" x2 weeks. Pt states pain comes in waves. Pt in visible distress in triage. Pt able to speak full sentences without stopping, room air saturations in the 90%s on room air in triage.       Reason for Admission  Shortness of breath, productive cough, pleuritic chest pain    Admission Date  1/9/2022    CODE STATUS  Full Code    HPI & HOSPITAL COURSE    * Right middle lobe pneumonia- (present on admission)  Assessment & Plan  Worsening cough, shortness of breath, sputum production for the past few days  WBC 17.6  CTA chest shows right-sided consolidation, right pleural effusion, right lymph node  Significant smoking history.  Possible postobstructive  We will continue Unasyn and azithromycin.  -Pulmonology consulted for possible work-up of malignancy outpatient.  Patient will require outpatient repeat CT scan with to ensure resolution, Which time consideration for possible malignancy work-up will be done.  -Sputum culture ordered  -Respiratory Viral panel    Addendum: Patient left AMA for unknown reasons        Acute respiratory failure with hypoxia (HCC)  Assessment & Plan  Secondary to right middle and lower lobe pneumonia complicated by right pleural effusion, pending thoracentesis.  Also likely secondary to COPD exacerbation in the setting of pneumonia.  Less likely heart failure given no orthopnea, PND, echocardiogram pending.  Wells score 0.  On 3 L oxygen.     Plan.  -Continue Unasyn and azithromycin to complete 5 days of antibiotics.  -Continue treatment for COPD with scheduled DuoNeb, Umeclidinium Vilanterol, prednisone 40 mg for 5 days.  -Maintain saturations above 90% given " pneumonia.  -RT protocol.  -Pulmonology consulted for possible evaluation of malignancy.Patient will require outpatient repeat CT scan with to ensure resolution, Which time consideration for possible malignancy work-up will be done.     Addendum: Patient left AMA for unknown reasons     Pleuritic chest pain- (present on admission)  Assessment & Plan  Right-sided chest wall pain that is worse with inspiration.  He does have right-sided pneumonia and pleural effusion  Troponin negative, EKG not concerning for acute ischemia  Incentive spirometry and NSAIDs for pain    Addendum: Patient left AMA for unknown reasons     Pleural effusion on right- (present on admission)  Assessment & Plan  -Moderate right-sided pleural effusion seen on CT chest  -Status post thoracentesis on 1/10/2022 .pending thoracentesis study results.  Serum LDH ordered, however not collected before patient left.  Will try to add to a.m. labs.  -Possibly secondary to pneumonia  -Pulmonology consulted    Addendum: Patient left AMA for unknown reasons     Chronic obstructive pulmonary disease with acute lower respiratory infection (HCC)- (present on admission)  Assessment & Plan  -No formal PFTs on file, scheduled for one outpatient however canceled due to technical issues, rescheduled.  Continues to smoke, has smoked for more than 30 years.  -Not on home oxygen.  -Appears to be in acute exacerbation, likely secondary to the pneumonia.        Plan.    -Prednisone 40 mg daily.  -Umeclidinium Vilanterol  -Schedule IV hour DuoNebs.  -Continue to treat for pneumonia  -Saturation to be kept around 90% given pneumonia.  -Respiratory therapy protocol.    Addendum: Patient left AMA for unknown reasons     Tobacco use- (present on admission)  Assessment & Plan  Significant smoking history, has cut down to less than half a pack a day.  Counseled on cessation for more than 3 minutes  Does not want nicotine patch, prefers to gum       Addendum: Patient left AMA  for unknown reasons

## 2022-01-10 NOTE — ASSESSMENT & PLAN NOTE
-No formal PFTs on file, scheduled for one outpatient however canceled due to technical issues, rescheduled.  Continues to smoke, has smoked for more than 30 years.  -Not on home oxygen.  -Appears to be in acute exacerbation, likely secondary to the pneumonia.      Plan.    -Prednisone 40 mg daily.  -Umeclidinium Vilanterol  -Schedule IV hour DuoNebs.  -Continue to treat for pneumonia  -Saturation to be kept around 90% given pneumonia.  -Respiratory therapy protocol.

## 2022-01-10 NOTE — PROGRESS NOTES
Pt standing up at end of bed reporting increased SOB. Notified RT. Medicated for pain. Pt currently on 9L via oxymask 89%.
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
## 2022-01-10 NOTE — ASSESSMENT & PLAN NOTE
-Moderate right-sided pleural effusion seen on CT chest  -Pending ultrasound-guided thoracentesis to calculate lights criteria.  Serum LDH ordered  -Possibly secondary to pneumonia  -Pulmonology consulted

## 2022-01-10 NOTE — ED TRIAGE NOTES
"Chief Complaint   Patient presents with   • Difficulty Breathing     Pt complains of difficulty breathing, \"pain all up and down my lungs\" x2 weeks. Pt states pain comes in waves. Pt in visible distress in triage. Pt able to speak full sentences without stopping, room air saturations in the 90%s on room air in triage.     Pt educated upon triage process and told to inform  staff of any changes in condition so that Pt may be reassessed. No further questions at this time. Pt sitting out in lobby.    "

## 2022-01-10 NOTE — ED PROVIDER NOTES
"ER Provider Note     Scribed for Uzair Brooke M.D. by Billy Ruggiero. 1/9/2022, 6:04 PM.    Primary Care Provider: No primary care provider noted.  Means of Arrival: Walk-in   History obtained from: Patient  History limited by: None     CHIEF COMPLAINT  Chief Complaint   Patient presents with    Difficulty Breathing     Pt complains of difficulty breathing, \"pain all up and down my lungs\" x2 weeks. Pt states pain comes in waves. Pt in visible distress in triage. Pt able to speak full sentences without stopping, room air saturations in the 90%s on room air in triage.     HPI  Dale Mckeon is a 61 y.o. male with a history of COPD who presents to the Emergency Department for worsening waxing and waning pain surrounding the right lung that started about 2 weeks ago. The patient states that he was waiting for a CT-chest in December that was canceled. He reports associated shortness of breath and chronic cough. He states that his pain is frequently exacerbated by taking a breath. No alleviating factors were identified. He denies associated fever, sore throat, or congestion. The patient has received the Pfizer COVID vaccination. The patient has a history of smoking.    REVIEW OF SYSTEMS  See HPI for further details. All other systems are negative.     PAST MEDICAL HISTORY   has a past medical history of Chronic obstructive pulmonary disease (HCC).    SURGICAL HISTORY  patient denies any surgical history    SOCIAL HISTORY  Social History     Tobacco Use    Smoking status: Current Every Day Smoker     Packs/day: 0.25     Years: 30.00     Pack years: 7.50     Types: Cigarettes    Smokeless tobacco: Never Used   Vaping Use    Vaping Use: Never used   Substance Use Topics    Alcohol use: Yes     Comment: occ    Drug use: Not Currently      Social History     Substance and Sexual Activity   Drug Use Not Currently     FAMILY HISTORY  No family history noted.    CURRENT MEDICATIONS  Home Medications       Reviewed by " "Daria Murillo, PhT (Pharmacy Tech) on 01/09/22 at 1821  Med List Status: Complete     Medication Last Dose Status   albuterol 108 (90 Base) MCG/ACT Aero Soln inhalation aerosol 1/9/2022 Active   Cyanocobalamin (VITAMIN B-12 PO) 1/8/2022 Active   ibuprofen (MOTRIN) 200 MG Tab 7-10 days ago Active   Multiple Vitamins-Minerals (MULTIVITAMIN ADULTS 50+ PO) 1/9/2022 Active   naproxen (ALEVE) 220 MG tablet 1/9/2022 Active   Qssdfoghm-HKJ-LU-APAP (THERAFLU FLU/COLD/COUGH PO) 1/5/2022 Active   Tiotropium Bromide-Olodaterol (STIOLTO RESPIMAT) 2.5-2.5 MCG/ACT Aero Soln 1/9/2022 Active                  ALLERGIES  No Known Allergies    PHYSICAL EXAM  VITAL SIGNS: /80   Pulse 94   Temp 37.5 °C (99.5 °F) (Temporal)   Resp (!) 22   Ht 1.727 m (5' 8\")   Wt 68.6 kg (151 lb 3.8 oz)   SpO2 93%   BMI 23.00 kg/m²    Constitutional: Alert in mild distress  HENT: No signs of trauma, Bilateral external ears normal, Nose normal.   Eyes: Pupils are equal and reactive, Conjunctiva normal, Non-icteric.   Neck: Normal range of motion, No tenderness, Supple, No stridor.   Lymphatic: No lymphadenopathy noted.   Cardiovascular: Regular rate and rhythm, no palpable thrill  Thorax & Lungs: Wheezing throughout lung fields.  CTAB  Abdomen: Bowel sounds normal, Soft, No tenderness, No masses, No pulsatile masses. No peritoneal signs.  Skin: Warm, Dry, No erythema, No rash.   Back: No bony tenderness, No CVA tenderness.   Extremities: Intact distal pulses, No edema, No tenderness, No cyanosis.  Musculoskeletal: Good range of motion in all major joints. No tenderness to palpation or major deformities noted.   Neurologic: Alert , Normal motor function, Normal sensory function, No focal deficits noted.  Psychiatric: Affect normal, Judgment normal, Mood normal.    DIAGNOSTIC STUDIES / PROCEDURES    EKG Interpretation:  Interpreted by me  12 Lead EKG interpreted by me to show:  Normal sinus rhythm  Rate 78  Axis: Normal  Intervals: " Normal  No ST elevation, depression, or T-wave inversion  My impression of this EKG: Does not indicate ischemia or arrhythmia at this time.     LABS  Labs Reviewed   CBC WITH DIFFERENTIAL - Abnormal; Notable for the following components:       Result Value    WBC 17.6 (*)     MCHC 33.1 (*)     Platelet Count 741 (*)     MPV 8.4 (*)     Lymphocytes 15.50 (*)     Eosinophils 7.70 (*)     Basophils 2.60 (*)     Neutrophils (Absolute) 11.53 (*)     Monos (Absolute) 1.37 (*)     Eos (Absolute) 1.36 (*)     Baso (Absolute) 0.46 (*)     All other components within normal limits    Narrative:     Collected By:   COMP METABOLIC PANEL - Abnormal; Notable for the following components:    AST(SGOT) <5 (*)     All other components within normal limits    Narrative:     Collected By:   TROPONIN    Narrative:     Collected By:   COV-2, FLU A/B, AND RSV BY PCR    Narrative:     Collected By:  Have you been in close contact with a person who is suspected  or known to be positive for COVID-19 within the last 30 days  (e.g. last seen that person < 30 days ago)->Unknown   ESTIMATED GFR    Narrative:     Collected By:   DIFFERENTIAL MANUAL    Narrative:     Collected By:   PERIPHERAL SMEAR REVIEW    Narrative:     Collected By:   PLATELET ESTIMATE    Narrative:     Collected By:   FLUID TOTAL PROTEIN    Narrative:     Collected By:   FLUID CELL COUNT   FLUID LDH   FLUID CULTURE W/GRAM STAIN   FLUID ALBUMIN   FLUID GLUCOSE   FLUID PH     All labs reviewed by me.    RADIOLOGY  CT-CTA CHEST PULMONARY ARTERY W/ RECONS   Final Result      Moderate right pleural effusion with middle greater than lower lobe consolidation and groundglass concerning for pneumonia. Underlying malignancy is not excluded and short interval follow-up to resolution is recommended. Correlation with pleural fluid    may also prove helpful      Moderate right hilar adenopathy      No CT evidence of pulmonary thromboembolism      IR-CONSULT AND TREAT    (Results Pending)       The radiologist's interpretation of all radiological studies have been reviewed by me.    COURSE & MEDICAL DECISION MAKING  Pertinent Labs & Imaging studies reviewed. (See chart for details)    This is a 61 y.o. male that presents with right-sided lung pain.  I am concerned about pulmonary embolism versus pneumonia versus COVID.  We will get the below labs and imaging and reassess..     6:04 PM - Patient seen and examined at bedside. Ordered CT-CTA Chest Pulmonary Artery w/ recons, CBC w/ diff, CMP, Troponin, COV-2 Flu PCR swab, and EKG.  Patient will be medicated with Albuterol 2 puff for his symptoms.     7:42 PM - Patient will be treated with Rocephin 2g and Zithromax 500mg in D5W. [facility] hospitalist.    7:53 PM I discussed the patient's case and the above findings with Dr. Solomon (Hospitalist) who agreed to evaluate the patient for admission.    8:00 PM - I reevaluated the patient at bedside. I informed the patient of my plan to admit today given the patient's current presentation and diagnostic study results. Patient verbalizes understanding and support with my plan for admission.     Patient was found to have a moderate right pleural effusion with what appears to be a lower lobe consolidation.  Patient is a white count of 17.  We will give IV antibiotics.  COVID swab is sent and pending.  We will admit the patient for thoracentesis and continued IV therapy.    DISPOSITION:  Patient will be hospitalized by Dr. Solomon in guarded condition.    FINAL IMPRESSION  1. Pneumonia of right lung due to infectious organism, unspecified part of lung    2. Pleural effusion       Billy PARKER (Kt), am scribing for, and in the presence of, Uzair Brooke M.D..    Electronically signed by: Billy Ruggiero (Kt), 1/9/2022    Uzair PARKER M.D. personally performed the services described in this documentation, as scribed by Billy Ruggiero in my presence, and it is both accurate and complete. C.    The note accurately  reflects work and decisions made by me.  Uzair Brooke M.D.  1/10/2022  1:16 AM
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
## 2022-01-10 NOTE — CARE PLAN
The patient is Stable - Low risk of patient condition declining or worsening    Shift Goals  Clinical Goals: Pain control, IR procedure, behavioral compliance  Patient Goals: Pain control, rest  Family Goals: No family present    Problem: Knowledge Deficit - Standard  Goal: Patient and family/care givers will demonstrate understanding of plan of care, disease process/condition, diagnostic tests and medications  Outcome: Progressing     Problem: Pain - Standard  Goal: Alleviation of pain or a reduction in pain to the patient’s comfort goal  Outcome: Progressing       Progress made toward(s) clinical / shift goals:      Patient updated on current plan of care and verbalizes understanding.   Pain is controlled with current medications per MAR.       Patient is not progressing towards the following goals:
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
## 2022-01-10 NOTE — ASSESSMENT & PLAN NOTE
Worsening cough, shortness of breath, sputum production for the past few days  WBC 17.6  CTA chest shows right-sided consolidation, right pleural effusion, right lymph node  Significant smoking history.  Possible postobstructive  We will continue Unasyn and azithromycin.  -Pulmonology consulted for possible work-up of malignancy outpatient.  -Sputum culture sent

## 2022-01-10 NOTE — ASSESSMENT & PLAN NOTE
Right-sided chest wall pain that is worse with inspiration.  He does have right-sided pneumonia and pleural effusion  Troponin negative, EKG not concerning for acute ischemia  Incentive spirometry and NSAIDs for pain
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
## 2022-01-11 NOTE — PROGRESS NOTES
Dale Mckeon patient has chosen to leave the hospital against medical advice. The attending physician has not discharged the patient. Patient is not a risk to himself or others. I have discussed with the patient the following:  Physician has not determined patient is ready for discharge, Risks and consequences of leaving the hospital too soon and Benefit of continued hospitalization.      Discharge against medical advice form has been Patient left abruptly - no chance to sign.      Patient is a greater than 60 years old  and a referral to  was made.    Attending physician has been notified.       Pt agitated regarding diet. Refused to stay while diet was ordered. Educated patient on discharge clearance. Pt continued to get ready to leave.

## 2022-01-11 NOTE — ASSESSMENT & PLAN NOTE
Likely community acquired pneumonia. Adenopathy could be reactive to pneumonia as well.   -Agree with ceftriaxone and azithromycin tx for CAP  -Continue to follow labs, clinical status

## 2022-01-11 NOTE — PROGRESS NOTES
Shay BUNN consented patient and performed  A right thoracentesis in patients room.  500  ml of fluid removed,     500 ml of fluid sent to lab .  Vitals monitored during procedure and documented in EPIC.  Patient tolerated procedure well.. Report given to RN.

## 2022-01-11 NOTE — ASSESSMENT & PLAN NOTE
Secondary to right middle and lower lobe pneumonia complicated by right pleural effusion, pending thoracentesis.  Also likely secondary to COPD exacerbation in the setting of pneumonia.  Less likely heart failure given no orthopnea, PND, echocardiogram pending.  Wells score 0.    Plan.  -Continue Unasyn and azithromycin to complete 5 days of antibiotics.  -Continue treatment for COPD with scheduled DuoNeb, Umeclidinium Vilanterol, prednisone 40 mg for 5 days.  -Maintain saturations above 90% given pneumonia.  -RT protocol.  -Pulmonology consulted for possible evaluation of malignancy.

## 2022-01-11 NOTE — ASSESSMENT & PLAN NOTE
Seen on CTPE incidentally. Could be reactive due to pneumonia vs malignancy. Will need outpatient follow up to evaluate for resolution or changes with repeat imaging. Further imaging or diagnostic procedure I.e bronchoscopy is not indicated at present time, however if there is recurrence of pneumonia despite adequate treatment then certainly should be considered.

## 2022-01-11 NOTE — ASSESSMENT & PLAN NOTE
Has suspected COPD without PFT's done as outpatient. Previously on albuterol and LABA/LAMA however was not adherent to regimen. On evaluation he has significantly decreased exercise tolerance and found to have respiratory distress with pursed lip breathing and tripoding with minimal exertion. He also had diminished air movement on auscultation.  -Recommend tx with optimized COPD regimen consisting of ICS, LABA, and LAMA  -Recommend 5 day course of systemic steroids as tx for exacerbation with etiology likely to be pneumonia  -Scheduled duonebs  -Recommend obtaining echocardiogram as part of workup for respiratory sx and pleural effusion  -Outpatient pulm clinic follow-up  -Respiratory viral panel

## 2022-01-14 LAB
BACTERIA FLD AEROBE CULT: NORMAL
GRAM STN SPEC: NORMAL
SIGNIFICANT IND 70042: NORMAL
SITE SITE: NORMAL
SOURCE SOURCE: NORMAL

## 2022-01-16 ENCOUNTER — APPOINTMENT (OUTPATIENT)
Dept: RADIOLOGY | Facility: MEDICAL CENTER | Age: 62
End: 2022-01-16
Attending: EMERGENCY MEDICINE
Payer: MEDICAID

## 2022-01-16 ENCOUNTER — HOSPITAL ENCOUNTER (EMERGENCY)
Facility: MEDICAL CENTER | Age: 62
End: 2022-01-16
Attending: EMERGENCY MEDICINE
Payer: MEDICAID

## 2022-01-16 VITALS
HEIGHT: 68 IN | WEIGHT: 152 LBS | RESPIRATION RATE: 18 BRPM | BODY MASS INDEX: 23.04 KG/M2 | DIASTOLIC BLOOD PRESSURE: 70 MMHG | HEART RATE: 80 BPM | SYSTOLIC BLOOD PRESSURE: 140 MMHG | OXYGEN SATURATION: 92 % | TEMPERATURE: 98.4 F

## 2022-01-16 DIAGNOSIS — M79.2 NERVE PAIN: ICD-10-CM

## 2022-01-16 PROCEDURE — 71045 X-RAY EXAM CHEST 1 VIEW: CPT

## 2022-01-16 PROCEDURE — 99283 EMERGENCY DEPT VISIT LOW MDM: CPT

## 2022-01-16 PROCEDURE — 700101 HCHG RX REV CODE 250: Performed by: EMERGENCY MEDICINE

## 2022-01-16 PROCEDURE — 94640 AIRWAY INHALATION TREATMENT: CPT

## 2022-01-16 RX ORDER — IPRATROPIUM BROMIDE AND ALBUTEROL SULFATE 2.5; .5 MG/3ML; MG/3ML
3 SOLUTION RESPIRATORY (INHALATION) ONCE
Status: COMPLETED | OUTPATIENT
Start: 2022-01-16 | End: 2022-01-16

## 2022-01-16 RX ADMIN — IPRATROPIUM BROMIDE AND ALBUTEROL SULFATE 3 ML: .5; 2.5 SOLUTION RESPIRATORY (INHALATION) at 15:54

## 2022-01-16 ASSESSMENT — COPD QUESTIONNAIRES
DO YOU EVER COUGH UP ANY MUCUS OR PHLEGM?: YES, A FEW DAYS A WEEK OR MONTH
HAVE YOU SMOKED AT LEAST 100 CIGARETTES IN YOUR ENTIRE LIFE: YES
DURING THE PAST 4 WEEKS HOW MUCH DID YOU FEEL SHORT OF BREATH: MOST  OR ALL OF THE TIME
COPD SCREENING SCORE: 8

## 2022-01-16 ASSESSMENT — FIBROSIS 4 INDEX: FIB4 SCORE: 0.1

## 2022-01-16 NOTE — ED TRIAGE NOTES
"Chief Complaint   Patient presents with   • Pain     Pt reports intermittent \"shooting\" nerve pain from L clavicle down his body. Pt states symtoms for \"years\" but it has increased over the last 2 weeks.     /68   Pulse 99   Temp 37.7 °C (99.8 °F) (Temporal)   Resp (!) 22   Ht 1.727 m (5' 8\")   Wt 68.9 kg (152 lb)   SpO2 92%   BMI 23.11 kg/m²     Pt brought into triage by WC, mask in place. NAD. Pt admitted on 1/9 for pneumonia and left AMA, seen at [facility] on 1/12 for same symptoms. Encouraged to return to the triage nurse or tech with any new complaints or symptoms.  "

## 2022-01-16 NOTE — ED NOTES
"PT awake,semi reclined in bed, speaking full sentences without signs of respiratory distress. Pt states pain \"under my right clavicle\" which increases with \"stress\" and describes as \"electric shocks\".   "

## 2022-01-16 NOTE — ED PROVIDER NOTES
"ED Provider Note    Scribed for Casey Adam M.D. by Casey Adam M.D.. 1/16/2022,  3:20 PM.    CHIEF COMPLAINT  Chief Complaint   Patient presents with   • Pain     Pt reports intermittent \"shooting\" nerve pain from L clavicle down his body. Pt states symtoms for \"years\" but it has increased over the last 2 weeks.       HPI  Dale Mckeon is a 61 y.o. male who presents to the Emergency Department complaining of on going right-sided subclavicular nerve pain that he says radiates down the whole side of his body.  He says that this pain has been happening since 1987, but has been worse over the past 2 weeks, in the setting of a lung infection.  He was admitted on the ninth, then left AMA on the 10th. After leaving AGAINST MEDICAL ADVICE from his admission here on the ninth into the 10th, he was seen at [facility] on the 12th, where he had repeat labs and chest x-ray, was considered safe and appropriate for outpatient treatment of any residual pneumonia, was again reminded that he needs a follow-up CT as an outpatient to assure resolution of previous imaging findings, and was started on appropriate courses of Augmentin and azithromycin for pneumonia, and gabapentin for the years of clavicular nerve pain that he has been describing.  He has been on gabapentin for 3 days.  He has a primary care doctor at the [facility].  Explained the patient that he will likely need follow-up with his primary care doctor for chronic pain, which is unlikely to be resolved in the emergency department, and is likely exacerbated by his pulmonary infection.  At this point the patient describes another number of painful musculoskeletal complaints of uncertain chronicity.  He has normal vital signs on arrival, he is speaking in complete sentences, no increased work of breathing.  He says he felt better in the hospital when he was doing breathing treatments for his COPD.  I offered him a breathing treatment " "today.    Pulmonology was consulted on the patient's recent admission here, and recommended outpatient follow-up for CT scan for resolution of lymphadenopathy and other CT concerns.    Records show he was admitted here on the ninth, had an x-ray that showed a moderate right-sided pleural effusion in addition to evidence of pneumonia.  On the 10th, he had an ultrasound-guided thoracentesis.    REVIEW OF SYSTEMS  See HPI for further details. All other systems are negative.     PAST MEDICAL HISTORY   has a past medical history of Chronic obstructive pulmonary disease (HCC).    SOCIAL HISTORY  Social History     Tobacco Use   • Smoking status: Current Every Day Smoker     Packs/day: 0.25     Years: 30.00     Pack years: 7.50     Types: Cigarettes   • Smokeless tobacco: Never Used   Vaping Use   • Vaping Use: Never used   Substance and Sexual Activity   • Alcohol use: Yes     Comment: occ   • Drug use: Not Currently   • Sexual activity: Not on file     Social History     Substance and Sexual Activity   Drug Use Not Currently       SURGICAL HISTORY  patient denies any surgical history    CURRENT MEDICATIONS  Home Medications     Reviewed by Shelby Barragan R.N. (Registered Nurse) on 01/16/22 at 1405  Med List Status: Not Addressed   Medication Last Dose Status   albuterol 108 (90 Base) MCG/ACT Aero Soln inhalation aerosol  Active   Cyanocobalamin (VITAMIN B-12 PO)  Active   ibuprofen (MOTRIN) 200 MG Tab  Active   Multiple Vitamins-Minerals (MULTIVITAMIN ADULTS 50+ PO)  Active   naproxen (ALEVE) 220 MG tablet  Active   Uetawlppi-XIR-VC-APAP (THERAFLU FLU/COLD/COUGH PO)  Active   Tiotropium Bromide-Olodaterol (STIOLTO RESPIMAT) 2.5-2.5 MCG/ACT Aero Soln  Active                ALLERGIES  No Known Allergies    PHYSICAL EXAM  VITAL SIGNS: /81   Pulse (!) 103   Temp 37.7 °C (99.8 °F) (Temporal)   Resp 18   Ht 1.727 m (5' 8\")   Wt 68.9 kg (152 lb)   SpO2 90%   BMI 23.11 kg/m²   Pulse ox interpretation: I " interpret this pulse ox as normal.  Constitutional: Alert in no apparent distress.  HENT: No signs of trauma, Bilateral external ears normal, Nose normal.   Eyes: Conjunctiva normal, Non-icteric.   Neck: Normal range of motion, Supple, No stridor.   Lymphatic: No lymphadenopathy noted.   Cardiovascular: Regular rate and rhythm, no murmurs.   Thorax & Lungs: Normal breath sounds, No respiratory distress, No wheezing, No chest tenderness.   Abdomen: Bowel sounds normal, Soft, No tenderness, No masses, No pulsatile masses. No peritoneal signs.  Skin: Warm, Dry, No erythema, No rash.   Extremities: Intact distal pulses, No edema, No cyanosis.  Musculoskeletal: Good range of motion in all major joints. No or major deformities noted.   Neurologic: Alert , Normal motor function, Normal sensory function, No focal deficits noted.   Psychiatric: Affect normal, Judgment normal, Mood normal.     DIAGNOSTIC STUDIES / PROCEDURES      RADIOLOGY  DX-CHEST-LIMITED (1 VIEW)   Final Result      1.  Small RIGHT pleural effusion   2.  RIGHT-sided atelectasis and possible superimposed airspace disease, similar to appearance on CT        The radiologist's interpretation of all radiological studies have been reviewed by me.    COURSE & MEDICAL DECISION MAKING  Nursing notes, VS, PMSFHx reviewed in chart.     3:20 PM Patient seen and examined at bedside. Differential diagnosis includes but is not limited to chronic pain syndrome/chronic nerve pain, likely exacerbated by pulmonary infection. Ordered for chest x-ray to evaluate, as the patient did recently have a significant right-sided pleural effusion. Patient will be treated with a DuoNeb treatment for his symptoms.  I carefully explained that chronic pain is not the purview of the emergency department, and that although uncomfortable, pain that has been bothering him since the 1980s is not dangerous to him, and that he has only recently been started on gabapentin, which would not have had  a chance to start working it, and might need significant upward adjustment with the help of his primary care doctor.    4:24 PM chest x-ray does not show reaccumulation of pleuritic fluid. There is still some abnormalities, but this is consistent with the patient's recent diagnoses, as well as delayed initiation of antibiotics, after leaving here AGAINST MEDICAL ADVICE, than being seen on the 12th [place] and prescribed antibiotics only then. The patient is safe and appropriate for discharge. Regarding his 20+ years of chronic nerve pain, we discussed that this is likely exacerbated by his pulmonary infection, and that gabapentin was an appropriate choice, and that he can increase to 200 mg 3 times per day, and then again to 300 mg 3 times per day unless he notices any side effects. He needs to call his primary care doctor to arrange a follow-up CT scan sometime in the relatively near future, and he has been reminded of this at each of his last 3 hospital encounters.     The patient will return for new or worsening symptoms and is stable at the time of discharge.    The patient is referred to a primary physician for blood pressure management, diabetic screening, and for all other preventative health concerns.      DISPOSITION:  Patient will be discharged home in stable condition.    FOLLOW UP:  Your primary care doctor.    Call in 1 day        OUTPATIENT MEDICATIONS:  New Prescriptions    No medications on file         FINAL IMPRESSION  1. Nerve pain

## 2022-01-17 NOTE — ED NOTES
Pt awake, semi reclined in bed, speaking without signs of distress. States understanding of discharge instructions.

## 2022-01-17 NOTE — DISCHARGE INSTRUCTIONS
Your x-ray today did not show fluid reaccumulating in the right side of your chest. Make sure you finish the antibiotics prescribed by [facility] on the 12th. Continue the gabapentin they prescribed. If you are not noticing any side effects from the gabapentin, you can increase it to 200 mg 3 times per day, and then 300 mg 3 times per day, if you are not having any side effects with dose increases.    Please remember that you are CT scan from here on the ninth demonstrated a need for a follow-up CT scan in the future, coordinated by your primary care doctor. Schedule an appointment as soon as you can for a visit with your regular doctor.

## 2022-06-27 ENCOUNTER — HOSPITAL ENCOUNTER (EMERGENCY)
Facility: MEDICAL CENTER | Age: 62
End: 2022-06-27
Payer: MEDICAID

## 2022-06-27 VITALS
BODY MASS INDEX: 22.11 KG/M2 | WEIGHT: 149.25 LBS | HEIGHT: 69 IN | TEMPERATURE: 98.7 F | OXYGEN SATURATION: 96 % | HEART RATE: 73 BPM | DIASTOLIC BLOOD PRESSURE: 61 MMHG | SYSTOLIC BLOOD PRESSURE: 104 MMHG | RESPIRATION RATE: 20 BRPM

## 2022-06-27 PROCEDURE — 302449 STATCHG TRIAGE ONLY (STATISTIC)

## 2022-06-27 ASSESSMENT — FIBROSIS 4 INDEX: FIB4 SCORE: 0.1

## 2022-06-28 NOTE — ED TRIAGE NOTES
"Chief Complaint   Patient presents with   • Nerves     On R side neck pt states is stimulated by sickness (last time nerve pain was exaggerated d/t pneumonia), intermittent, whole body \"locks up\" when triggered, had COVID booster x1 week ago, states nerve blockers (gabapentin) work best, -neuropathy    • Fever     EMS temp 100.6F, cough        BIB [facility] for above complaint. Pt took total 3400mg IBU today. EMS vitals 130/67 HR 90 95% RA GCS 15    Pt with fever, chills. Pt expressively angry and irritated by not getting Gabapentin from Urgent Care Facility. Pt educated of triage process and informed to contact staff if situation changes.    /61   Pulse 73   Temp (!) 38.1 °C (100.5 °F) (Oral)   Resp 20   Ht 1.753 m (5' 9\")   Wt 67.7 kg (149 lb 4 oz)   SpO2 96%   BMI 22.04 kg/m²     "

## 2022-11-14 ENCOUNTER — HOSPITAL ENCOUNTER (EMERGENCY)
Facility: MEDICAL CENTER | Age: 62
End: 2022-11-14
Attending: EMERGENCY MEDICINE
Payer: MEDICAID

## 2022-11-14 VITALS
HEART RATE: 64 BPM | RESPIRATION RATE: 16 BRPM | DIASTOLIC BLOOD PRESSURE: 65 MMHG | SYSTOLIC BLOOD PRESSURE: 113 MMHG | TEMPERATURE: 97 F | OXYGEN SATURATION: 91 %

## 2022-11-14 DIAGNOSIS — J22 LOWER RESP. TRACT INFECTION: ICD-10-CM

## 2022-11-14 DIAGNOSIS — M54.2 TRIGGER POINT OF NECK: ICD-10-CM

## 2022-11-14 PROCEDURE — 700101 HCHG RX REV CODE 250: Performed by: EMERGENCY MEDICINE

## 2022-11-14 PROCEDURE — A9270 NON-COVERED ITEM OR SERVICE: HCPCS | Performed by: EMERGENCY MEDICINE

## 2022-11-14 PROCEDURE — 700102 HCHG RX REV CODE 250 W/ 637 OVERRIDE(OP): Performed by: EMERGENCY MEDICINE

## 2022-11-14 PROCEDURE — 20552 NJX 1/MLT TRIGGER POINT 1/2: CPT

## 2022-11-14 PROCEDURE — 700111 HCHG RX REV CODE 636 W/ 250 OVERRIDE (IP): Performed by: EMERGENCY MEDICINE

## 2022-11-14 PROCEDURE — 99283 EMERGENCY DEPT VISIT LOW MDM: CPT

## 2022-11-14 RX ORDER — OXYCODONE HYDROCHLORIDE AND ACETAMINOPHEN 5; 325 MG/1; MG/1
1 TABLET ORAL EVERY 6 HOURS PRN
Qty: 6 TABLET | Refills: 0 | Status: SHIPPED | OUTPATIENT
Start: 2022-11-14 | End: 2022-11-14 | Stop reason: SDUPTHER

## 2022-11-14 RX ORDER — OXYCODONE HYDROCHLORIDE AND ACETAMINOPHEN 5; 325 MG/1; MG/1
1 TABLET ORAL ONCE
Status: COMPLETED | OUTPATIENT
Start: 2022-11-14 | End: 2022-11-14

## 2022-11-14 RX ORDER — OXYCODONE HYDROCHLORIDE AND ACETAMINOPHEN 5; 325 MG/1; MG/1
1 TABLET ORAL EVERY 6 HOURS PRN
Qty: 6 TABLET | Refills: 0 | Status: SHIPPED | OUTPATIENT
Start: 2022-11-14 | End: 2022-11-16 | Stop reason: SDUPTHER

## 2022-11-14 RX ORDER — LIDOCAINE HYDROCHLORIDE 20 MG/ML
20 INJECTION, SOLUTION INFILTRATION; PERINEURAL ONCE
Status: COMPLETED | OUTPATIENT
Start: 2022-11-14 | End: 2022-11-14

## 2022-11-14 RX ORDER — AZITHROMYCIN 250 MG/1
250 TABLET, FILM COATED ORAL DAILY
Qty: 6 TABLET | Refills: 0 | Status: SHIPPED | OUTPATIENT
Start: 2022-11-14 | End: 2022-11-14 | Stop reason: SDUPTHER

## 2022-11-14 RX ORDER — AZITHROMYCIN 250 MG/1
500 TABLET, FILM COATED ORAL ONCE
Status: COMPLETED | OUTPATIENT
Start: 2022-11-14 | End: 2022-11-14

## 2022-11-14 RX ORDER — AZITHROMYCIN 250 MG/1
250 TABLET, FILM COATED ORAL DAILY
Qty: 6 TABLET | Refills: 0 | Status: SHIPPED | OUTPATIENT
Start: 2022-11-14 | End: 2022-11-18

## 2022-11-14 RX ORDER — TRIAMCINOLONE ACETONIDE 40 MG/ML
40 INJECTION, SUSPENSION INTRA-ARTICULAR; INTRAMUSCULAR ONCE
Status: COMPLETED | OUTPATIENT
Start: 2022-11-14 | End: 2022-11-14

## 2022-11-14 RX ADMIN — AZITHROMYCIN MONOHYDRATE 500 MG: 250 TABLET ORAL at 11:33

## 2022-11-14 RX ADMIN — OXYCODONE AND ACETAMINOPHEN 1 TABLET: 5; 325 TABLET ORAL at 11:33

## 2022-11-14 RX ADMIN — TRIAMCINOLONE ACETONIDE 40 MG: 40 INJECTION, SUSPENSION INTRA-ARTICULAR; INTRAMUSCULAR at 10:45

## 2022-11-14 RX ADMIN — LIDOCAINE HYDROCHLORIDE 20 ML: 20 INJECTION, SOLUTION INFILTRATION; PERINEURAL at 10:45

## 2022-11-14 NOTE — ED PROVIDER NOTES
ED Provider Note    CHIEF COMPLAINT   Chief Complaint   Patient presents with    Clavicle Pain       HPI   Dale Mckeon is a 62 y.o. male who presents with a chief complaint of clavicle pain.  He points to his clavicle.  Posterior portion.  Medial third.  It is worse when he coughs or this comes on spontaneously.  It is sharp and grabs him.  Nonradiating.  No associated fever chills or numbness or tingling of his extremities.    Patient has history of chronic clavicular pain is under control triggers usually having illness he had a recent cough and cold.  Nonproductive sputum no fevers or chills.    I reviewed the chart the patient has a history of clavicular pain in the past.    REVIEW OF SYSTEMS   Cardiovascular: No discoloration of his fingers.  Musculoskeletal:  See above  Dermatologic: No lacerations or abrasions  Neurologic: No numbness or tingling    PAST MEDICAL HISTORY   Past Medical History:   Diagnosis Date    Chronic obstructive pulmonary disease (HCC)        SOCIAL HISTORY  Social History     Socioeconomic History    Marital status: Single   Tobacco Use    Smoking status: Every Day     Packs/day: 0.25     Years: 30.00     Pack years: 7.50     Types: Cigarettes    Smokeless tobacco: Never   Vaping Use    Vaping Use: Never used   Substance and Sexual Activity    Alcohol use: Yes     Comment: occ    Drug use: Not Currently       SURGICAL HISTORY  History reviewed. No pertinent surgical history.    CURRENT MEDICATIONS   Home Medications       Reviewed by Kisha Garcia R.N. (Registered Nurse) on 11/14/22 at 0910  Med List Status: <None>     Medication Last Dose Status   albuterol 108 (90 Base) MCG/ACT Aero Soln inhalation aerosol  Active   Cyanocobalamin (VITAMIN B-12 PO)  Active   ibuprofen (MOTRIN) 200 MG Tab  Active   Multiple Vitamins-Minerals (MULTIVITAMIN ADULTS 50+ PO)  Active   naproxen (ALEVE) 220 MG tablet  Active   Byalrvsgt-JCT-ED-APAP (THERAFLU FLU/COLD/COUGH PO)  Active   Tiotropium  Bromide-Olodaterol (STIOLTO RESPIMAT) 2.5-2.5 MCG/ACT Aero Soln  Active                    ALLERGIES   No Known Allergies    PHYSICAL EXAM  VITAL SIGNS: /68   Pulse 77   Temp 36 °C (96.8 °F) (Temporal)   Resp 18   SpO2 96%    Constitutional: Well developed, Well nourished, No acute distress, Non-toxic appearance.   Cardiovascular: Good pulses on the affected extremity. Good capillary refill.  Thorax & Lungs: No respiratory distress.  Possible decreased breath sounds in the lower lobes  Skin: No lacerations or abrasions or erythema noted over the clavicular area  Musculoskeletal: Tenderness over the greater clavicle on the left.  The patient has no step-off no deformity.  There is no swelling or erythema or tenderness on the right side really on the sternocleidomastoid where it divides into the posterior branch there appears to be tenderness.  Procedure    Neurologic: Good sensation to light touch on the distal affected extremity.    RADIOLOGY/PROCEDURES  Trigger point injection  The patient was lectured and told the risks of infection pneumothorax air embolus.  Patient agreed the procedure was prepped with chlorhexidine.  Injected with a total of 4 cc of 2% lidocaine with Kenalog.  He tolerated the procedure well with some relief    COURSE & MEDICAL DECISION MAKING  Pertinent Labs & Imaging studies reviewed. (See chart for details)  Patient is here with a chief complaint of clinical pain.  The patient is nonreproducible and is on posterior clavicle appears to be chronic in nature.  At this point we will go ahead and give him 1 Percocet.  I have ordered Zithromax as it may help with anti-inflammatory and at this point patient has significant pain that I think would be beneficial in this case to get something that could be viral with something that will help with anti-inflammatory.  Patient looks otherwise well nontoxic.  In addition we will go injections pain medicine.    12:53 PM  Patient looks otherwise  well nontoxic.  Injection was done for chronic condition.  I think the patient probably has a lower respiratory disease.  I think at this point the Zithromax may help with his respiratory illness as well.  He was told to take the medication as directed short course of narcotics about 5 Percocet were given.  Patient was deemed low risk for descr drug monitoring search done    He was encouraged to follow-up with a spinal surgeon at this is probably something related to spinal or musculoskeletal in nature.  Told that the ER is not a good place for chronic conditions.    FINAL IMPRESSION  1.  Sternocleido muscle spasm  2.  Lower respiratory illness  3.  Trigger point injection      Electronically signed by: Jaya Sr M.D., 11/14/2022 10:18 AM

## 2022-11-16 RX ORDER — OXYCODONE HYDROCHLORIDE AND ACETAMINOPHEN 5; 325 MG/1; MG/1
1 TABLET ORAL EVERY 6 HOURS PRN
Qty: 6 TABLET | Refills: 0 | Status: SHIPPED | OUTPATIENT
Start: 2022-11-16 | End: 2022-11-18

## 2022-11-16 NOTE — DISCHARGE PLANNING
Patient calls but is also having a call in the background with someone else. Patient calls and states [facility] wont fill narcotics of any kind and [facility] wouldn't accept the written Rx. Explained to patient that, that ERP is not on service at this time but that I would ask another. To wait approx an hour and then check in with [facility]. He states understanding.    Discussed with Dr. Jones.

## 2023-08-13 ENCOUNTER — APPOINTMENT (OUTPATIENT)
Dept: RADIOLOGY | Facility: MEDICAL CENTER | Age: 63
End: 2023-08-13
Attending: EMERGENCY MEDICINE

## 2023-08-13 ENCOUNTER — HOSPITAL ENCOUNTER (EMERGENCY)
Facility: MEDICAL CENTER | Age: 63
End: 2023-08-13
Attending: EMERGENCY MEDICINE

## 2023-08-13 VITALS
WEIGHT: 145.28 LBS | OXYGEN SATURATION: 94 % | HEIGHT: 69 IN | HEART RATE: 73 BPM | BODY MASS INDEX: 21.52 KG/M2 | DIASTOLIC BLOOD PRESSURE: 70 MMHG | SYSTOLIC BLOOD PRESSURE: 118 MMHG | RESPIRATION RATE: 18 BRPM | TEMPERATURE: 98.2 F

## 2023-08-13 DIAGNOSIS — L03.211 FACIAL CELLULITIS: ICD-10-CM

## 2023-08-13 DIAGNOSIS — K04.7 DENTAL ABSCESS: ICD-10-CM

## 2023-08-13 LAB
ALBUMIN SERPL BCP-MCNC: 3.2 G/DL (ref 3.2–4.9)
ALBUMIN/GLOB SERPL: 1.2 G/DL
ALP SERPL-CCNC: 82 U/L (ref 30–99)
ALT SERPL-CCNC: 63 U/L (ref 2–50)
ANION GAP SERPL CALC-SCNC: 8 MMOL/L (ref 7–16)
AST SERPL-CCNC: 77 U/L (ref 12–45)
BASOPHILS # BLD AUTO: 0.6 % (ref 0–1.8)
BASOPHILS # BLD: 0.06 K/UL (ref 0–0.12)
BILIRUB SERPL-MCNC: 0.3 MG/DL (ref 0.1–1.5)
BUN SERPL-MCNC: 13 MG/DL (ref 8–22)
CALCIUM ALBUM COR SERPL-MCNC: 9.6 MG/DL (ref 8.5–10.5)
CALCIUM SERPL-MCNC: 9 MG/DL (ref 8.5–10.5)
CHLORIDE SERPL-SCNC: 101 MMOL/L (ref 96–112)
CO2 SERPL-SCNC: 25 MMOL/L (ref 20–33)
CREAT SERPL-MCNC: 0.98 MG/DL (ref 0.5–1.4)
EOSINOPHIL # BLD AUTO: 0.12 K/UL (ref 0–0.51)
EOSINOPHIL NFR BLD: 1.1 % (ref 0–6.9)
ERYTHROCYTE [DISTWIDTH] IN BLOOD BY AUTOMATED COUNT: 46.6 FL (ref 35.9–50)
GFR SERPLBLD CREATININE-BSD FMLA CKD-EPI: 87 ML/MIN/1.73 M 2
GLOBULIN SER CALC-MCNC: 2.6 G/DL (ref 1.9–3.5)
GLUCOSE SERPL-MCNC: 98 MG/DL (ref 65–99)
HCT VFR BLD AUTO: 45.4 % (ref 42–52)
HGB BLD-MCNC: 15 G/DL (ref 14–18)
IMM GRANULOCYTES # BLD AUTO: 0.04 K/UL (ref 0–0.11)
IMM GRANULOCYTES NFR BLD AUTO: 0.4 % (ref 0–0.9)
LACTATE SERPL-SCNC: 1.2 MMOL/L (ref 0.5–2)
LYMPHOCYTES # BLD AUTO: 2.17 K/UL (ref 1–4.8)
LYMPHOCYTES NFR BLD: 20.5 % (ref 22–41)
MCH RBC QN AUTO: 30.4 PG (ref 27–33)
MCHC RBC AUTO-ENTMCNC: 33 G/DL (ref 32.3–36.5)
MCV RBC AUTO: 91.9 FL (ref 81.4–97.8)
MONOCYTES # BLD AUTO: 1.57 K/UL (ref 0–0.85)
MONOCYTES NFR BLD AUTO: 14.8 % (ref 0–13.4)
NEUTROPHILS # BLD AUTO: 6.62 K/UL (ref 1.82–7.42)
NEUTROPHILS NFR BLD: 62.6 % (ref 44–72)
NRBC # BLD AUTO: 0 K/UL
NRBC BLD-RTO: 0 /100 WBC (ref 0–0.2)
PLATELET # BLD AUTO: 244 K/UL (ref 164–446)
PMV BLD AUTO: 9.6 FL (ref 9–12.9)
POTASSIUM SERPL-SCNC: 4.1 MMOL/L (ref 3.6–5.5)
PROT SERPL-MCNC: 5.8 G/DL (ref 6–8.2)
RBC # BLD AUTO: 4.94 M/UL (ref 4.7–6.1)
SODIUM SERPL-SCNC: 134 MMOL/L (ref 135–145)
WBC # BLD AUTO: 10.6 K/UL (ref 4.8–10.8)

## 2023-08-13 PROCEDURE — 85025 COMPLETE CBC W/AUTO DIFF WBC: CPT

## 2023-08-13 PROCEDURE — 96375 TX/PRO/DX INJ NEW DRUG ADDON: CPT

## 2023-08-13 PROCEDURE — 71045 X-RAY EXAM CHEST 1 VIEW: CPT

## 2023-08-13 PROCEDURE — 83605 ASSAY OF LACTIC ACID: CPT

## 2023-08-13 PROCEDURE — 96374 THER/PROPH/DIAG INJ IV PUSH: CPT

## 2023-08-13 PROCEDURE — 700111 HCHG RX REV CODE 636 W/ 250 OVERRIDE (IP): Mod: JZ | Performed by: EMERGENCY MEDICINE

## 2023-08-13 PROCEDURE — 99284 EMERGENCY DEPT VISIT MOD MDM: CPT

## 2023-08-13 PROCEDURE — 36415 COLL VENOUS BLD VENIPUNCTURE: CPT

## 2023-08-13 PROCEDURE — 87040 BLOOD CULTURE FOR BACTERIA: CPT

## 2023-08-13 PROCEDURE — 80053 COMPREHEN METABOLIC PANEL: CPT

## 2023-08-13 RX ORDER — AMOXICILLIN AND CLAVULANATE POTASSIUM 875; 125 MG/1; MG/1
1 TABLET, FILM COATED ORAL 2 TIMES DAILY
Qty: 14 TABLET | Refills: 0 | Status: ACTIVE | OUTPATIENT
Start: 2023-08-13 | End: 2023-08-20

## 2023-08-13 RX ORDER — MORPHINE SULFATE 4 MG/ML
4 INJECTION INTRAVENOUS ONCE
Status: COMPLETED | OUTPATIENT
Start: 2023-08-13 | End: 2023-08-13

## 2023-08-13 RX ORDER — ONDANSETRON 2 MG/ML
4 INJECTION INTRAMUSCULAR; INTRAVENOUS ONCE
Status: COMPLETED | OUTPATIENT
Start: 2023-08-13 | End: 2023-08-13

## 2023-08-13 RX ADMIN — ONDANSETRON 4 MG: 2 INJECTION INTRAMUSCULAR; INTRAVENOUS at 16:24

## 2023-08-13 RX ADMIN — MORPHINE SULFATE 4 MG: 4 INJECTION, SOLUTION INTRAMUSCULAR; INTRAVENOUS at 16:24

## 2023-08-13 ASSESSMENT — FIBROSIS 4 INDEX: FIB4 SCORE: 0.21

## 2023-08-13 NOTE — DISCHARGE INSTRUCTIONS
Please follow-up with a dentist ASAP!  You can try the [facility] dental clinic.  Please call tomorrow for appointment.    Return to the ER for any worsening jaw swelling, worsening facial swelling, fevers over 100.4, shaking chills, difficulty fully opening your mouth, nausea, vomiting, difficulty swallowing or breathing, or for any concerns.    Perform good dental hygiene by brushing your teeth 3 times a day and flossing daily.

## 2023-08-13 NOTE — ED TRIAGE NOTES
Dale Mckeon  62 y.o. male  Chief Complaint   Patient presents with    Abscess     Since 8/11  Left lower jaw swelling       Pt amb to triage with steady gait for above complaint. Pt reports subjective fevers at home.  Pt also reports the swelling has gone down since yesterday.  Pt is alert and oriented, speaking in full sentences, follows commands and responds appropriately to questions. Not in any apparent distress. Respirations are even and unlabored.  Pt placed in lobby. Pt educated on triage process. Pt encouraged to alert staff for any changes.

## 2023-08-13 NOTE — ED PROVIDER NOTES
ER Provider Note    Scribed for Dr. Brandi Clark M.D. by Masood Hudson. 8/13/2023 3:50 PM    Primary Care Provider: Patient reports no primary care provider.     CHIEF COMPLAINT  Chief Complaint   Patient presents with    Abscess     Since 8/11  Left lower jaw swelling     EXTERNAL RECORDS REVIEWED  Records were reviewed which showed that the patient has been seen previously in this ED for a trigger point underneath his right clavicle.     HPI/ROS  LIMITATION TO HISTORY   None  OUTSIDE HISTORIAN(S):  None    Dale Mckeon is a 62 y.o. male who presents to the ED complaining of dental abscess 2 days ago. He reports that some teeth on the left lower jawline have been bothering him for a while but only started bothering him the last two days after he bit down on the soft green bean and had instant pain in one of his left lower teeth.  Since that incident, his left jawline has been noticeably swollen.  He also reports some intermittent sharp stabbing pain in his left collarbone area which he says he gets every time he either gets the flu, gets sick, or has an illness.  This is something this been going on for the last 3 to 4 years.  No nausea or vomiting.  He thinks is possible he may have had a little fever earlier today but he is afebrile here in the ER.  No chills.  No difficulty swallowing or breathing.  He does not have a dentist.    PAST MEDICAL HISTORY  Past Medical History:   Diagnosis Date    Chronic obstructive pulmonary disease (HCC)      SURGICAL HISTORY  No pertinent past surgical history noted.     FAMILY HISTORY  No pertinent family history noted.     SOCIAL HISTORY   reports that he has been smoking cigarettes. He has a 7.50 pack-year smoking history. He has never used smokeless tobacco. He reports current alcohol use. He reports that he does not currently use drugs.    CURRENT MEDICATIONS  Previous Medications    ALBUTEROL 108 (90 BASE) MCG/ACT AERO SOLN INHALATION AEROSOL    Inhale 2 Puffs every 6  "hours as needed for Shortness of Breath.    CYANOCOBALAMIN (VITAMIN B-12 PO)    Take 1 Tablet by mouth every day.    IBUPROFEN (MOTRIN) 200 MG TAB    Take 1,000 mg by mouth one time as needed for Mild Pain or Inflammation.    MULTIPLE VITAMINS-MINERALS (MULTIVITAMIN ADULTS 50+ PO)    Take 1 Tablet by mouth every day.    NAPROXEN (ALEVE) 220 MG TABLET    Take 220 mg by mouth every 8 hours as needed (Pain/fever).    APFCYBXMY-CMA-UA-APAP (THERAFLU FLU/COLD/COUGH PO)    Take 1 Packet by mouth every four hours as needed (Cough).    TIOTROPIUM BROMIDE-OLODATEROL (STIOLTO RESPIMAT) 2.5-2.5 MCG/ACT AERO SOLN    Inhale 2 Puffs every day.     ALLERGIES  Patient has no known allergies.    PHYSICAL EXAM  /75   Pulse 78   Temp 36.9 °C (98.5 °F) (Temporal)   Resp 19   Ht 1.753 m (5' 9\")   Wt 65.9 kg (145 lb 4.5 oz)   SpO2 95%      Constitutional: Patient occasionally winces in pain which she says is related to the sharp electrical pains in his right collarbone area which occurs every time he is sick with either the flu, pneumonia, or any other illness.  HENT: Normocephalic, Bilateral external ears normal. Nose normal.  Patient has poor dentition throughout.  There is a tender left lower canine.  Patient has swelling, tenderness and some induration to the left anterior jawline.  There is no obvious fluctuant area within the buccal mucosa consistent with easily accessible drainable abscess at this time.  No trismus.  No sublingual swelling.  No submental swelling.  No stridor.  No drooling.  Eyes: Pupils are equal and reactive. Conjunctiva normal, non-icteric.   Thorax & Lungs: Easy unlabored respirations.  Breath sounds are decreased bilaterally but otherwise clear to auscultation.  There is no tenderness in the supraclavicular region.  No obvious masses in the supraclavicular regions.  Heart is regular rate and rhythm without murmurs rubs or gallops.  No crepitus or subcu air.  Skin: Visualized skin is  Dry, No " erythema, No rash.   Extremities:   Full range of motion to all extremities.  Neurologic: Alert, clear speech.  Psychiatric: Affect and Mood normal    DIAGNOSTIC STUDIES    Labs:   All labs reviewed by me.  Results for orders placed or performed during the hospital encounter of 08/13/23   CBC WITH DIFFERENTIAL   Result Value Ref Range    WBC 10.6 4.8 - 10.8 K/uL    RBC 4.94 4.70 - 6.10 M/uL    Hemoglobin 15.0 14.0 - 18.0 g/dL    Hematocrit 45.4 42.0 - 52.0 %    MCV 91.9 81.4 - 97.8 fL    MCH 30.4 27.0 - 33.0 pg    MCHC 33.0 32.3 - 36.5 g/dL    RDW 46.6 35.9 - 50.0 fL    Platelet Count 244 164 - 446 K/uL    MPV 9.6 9.0 - 12.9 fL    Neutrophils-Polys 62.60 44.00 - 72.00 %    Lymphocytes 20.50 (L) 22.00 - 41.00 %    Monocytes 14.80 (H) 0.00 - 13.40 %    Eosinophils 1.10 0.00 - 6.90 %    Basophils 0.60 0.00 - 1.80 %    Immature Granulocytes 0.40 0.00 - 0.90 %    Nucleated RBC 0.00 0.00 - 0.20 /100 WBC    Neutrophils (Absolute) 6.62 1.82 - 7.42 K/uL    Lymphs (Absolute) 2.17 1.00 - 4.80 K/uL    Monos (Absolute) 1.57 (H) 0.00 - 0.85 K/uL    Eos (Absolute) 0.12 0.00 - 0.51 K/uL    Baso (Absolute) 0.06 0.00 - 0.12 K/uL    Immature Granulocytes (abs) 0.04 0.00 - 0.11 K/uL    NRBC (Absolute) 0.00 K/uL      Radiology:   The attending emergency physician has independently interpreted the diagnostic imaging associated with this visit and am waiting the final reading from the radiologist.   Preliminary Interpretation:     ER MD is reviewed the patient's chest x-ray.  No obvious pneumothorax.    Radiologist interpretation:   DX-CHEST-PORTABLE (1 VIEW)   Final Result         1. No acute cardiopulmonary abnormalities are identified.      CT-MAXILLOFACIAL WITH PLUS RECONS    (Results Pending)         Patient left the emergency department AMA before his CT scan.    COURSE & MEDICAL DECISION MAKING     ED Observation Status? No; Patient does not meet criteria for ED Observation.     INITIAL ASSESSMENT AND PLAN    Care Narrative:  Patient presents to the ER complaining of left lower jawline swelling and dental pain which began 2 days ago.  Patient has an obvious area of swelling to his left anterior jawline.  His left lower canine is tender to percussion and palpation.  There is no obvious drainable abscess that is easily accessible by myself at this time.  There is no sublingual or submental swelling.  Patient reports possible fever earlier today but he is afebrile at this time.  Vitals are normal and stable.  I ordered some blood work as well as a CT scan of the maxillofacial area to assess for abscess which may be operative.  However, after the registration staff member spoke to the patient and told him that his insurance was inactive, the patient said he needed to leave.  He says he cannot afford this ER visit and he is going to leave AMA.  Patient clearly has a dental abscess/facial cellulitis.  I told him it is really not a good idea to leave.  He is insistent upon leaving stating he just cannot pay for this visit.  I strongly encouraged him to follow-up with a dentist ASAP.  [facility] as they do sliding scale fee for service work.  I will also send him home with Augmentin since he obviously has a dental infection.  At this time patient is alert and oriented x4.  He is not intoxicated.  He clearly seems capable to make his own decision.  He will leave the hospital AGAINST MEDICAL ADVICE understanding that he is welcome to return at any time if he changes his mind about proceeding with the work-up.  I have also referred him to oral surgery.  Hopefully patient will improve on the antibiotic and hopefully he will follow-up with dentist as I instructed.  Patient has been given strict return precautions and discharge instructions and he understands treatment plan and follow-up.    3:50 PM - Patient was seen and evaluated at bedside. I discussed need for CT imaging as well as need  for antibiotics with the patient. Patient was given an opportunity to ask questions. Ordered labs and imaging to evaluate their symptoms. Patient will be treated with Unasyn 3g IV, 4mg morphine injection and 4mg Zofran injection for pain. Patient verbalizes understanding and agreement to this plan of care.     4:54 PM - Patient is requesting to leave since their insurance is not active and they don't believe they can pay for further care. The patient is leaving against medical advice.  I discussed with the patient the risks of leaving without receiving appropriate care to include permanent disability or death.  I have discussed possible alternatives.  The patient is not intoxicated.  The patient is a capable decision maker and understands the risks and benefits of their decision.  I advised the patient that they need an antibiotic and I advised them to at least see a dentist possibly going [facility]. While I certainly disagree with the patient's decision, I respect the patient's autonomy and will not keep them here against their will.  They understand that their decision to leave can be reversed at any time and they can return to us at any time for any reason at all.     ADDITIONAL PROBLEM LIST AND DISPOSITION    Problem #1: Left-sided dental abscess    I have discussed management of the patient with the following physicians and ARELY's: None    Discussion of management with other \[facility]\"" or appropriate source(s): None     Escalation of care considered, and ultimately not performed: after discussion with the patient / family, they have elected to decline an escalation in care.    Barriers to care at this time, including but not limited to: Patient does not have established PCP, Patient does not have insurance, and Patient lacks financial resources.     Decision tools and prescription drugs considered including, but not limited to: Antibiotics I will send the patient home on Augmentin even though he  is leaving AMA as he has a clear/obvious dental infection. .    The patient will return for new or worsening symptoms and is stable at the time of discharge.    The patient is referred to a primary physician for blood pressure management, diabetic screening, and for all other preventative health concerns.    DISPOSITION:  Patient will be discharged home in stable condition.    FOLLOW UP:  [facility] ([facility]) - Primary Care and Family Medicine  [address] [place] [id number]  [phone]  Schedule an appointment as soon as possible for a visit in 1 day  If symptoms worsen return to ER    OUTPATIENT MEDICATIONS:  New Prescriptions    AMOXICILLIN-CLAVULANATE (AUGMENTIN) 875-125 MG TAB    Take 1 Tablet by mouth 2 times a day for 7 days.     FINAL DIAGNOSIS  1. Dental abscess Acute   2. Facial cellulitis Acute     The note accurately reflects work and decisions made by me.  Brandi Clark M.D.  8/13/2023  6:44 PM    This dictation has been created using voice recognition software. The accuracy of the dictation is limited by the abilities of the software. I expect there may be some errors of grammar and possibly content. I made every attempt to manually correct the errors within my dictation. However, errors related to voice recognition software may still exist and should be interpreted within the appropriate context.     IMasood (Kt), am scribing for, and in the presence of, Brandi Clark M.D..    Electronically signed by: Masood Hudson (Kt), 8/13/2023    Brandi PARKER M.D. personally performed the services described in this documentation, as scribed by Masood Hudson in my presence, and it is both accurate and complete.

## 2023-08-14 NOTE — ED NOTES
Pt stating he would like to leave as he is concerned about the bill. Pt states he cannot afford all of these tests and medications. ERP notified, ERP at bedside.

## 2023-08-14 NOTE — ED NOTES
Pt elected to leave AMA. AMA paperwork signed by pt. PIV removed, pt ambulated out without need for assistance.

## 2023-08-18 LAB
BACTERIA BLD CULT: NORMAL
SIGNIFICANT IND 70042: NORMAL
SITE SITE: NORMAL
SOURCE SOURCE: NORMAL

## 2025-01-03 ENCOUNTER — TELEPHONE (OUTPATIENT)
Dept: HEALTH INFORMATION MANAGEMENT | Facility: OTHER | Age: 65
End: 2025-01-03